# Patient Record
Sex: MALE | Race: WHITE | Employment: FULL TIME | URBAN - NONMETROPOLITAN AREA
[De-identification: names, ages, dates, MRNs, and addresses within clinical notes are randomized per-mention and may not be internally consistent; named-entity substitution may affect disease eponyms.]

---

## 2019-10-15 ENCOUNTER — OFFICE VISIT (OUTPATIENT)
Dept: PRIMARY CARE CLINIC | Age: 59
End: 2019-10-15
Payer: COMMERCIAL

## 2019-10-15 VITALS
TEMPERATURE: 98.2 F | WEIGHT: 206.75 LBS | SYSTOLIC BLOOD PRESSURE: 130 MMHG | HEART RATE: 78 BPM | DIASTOLIC BLOOD PRESSURE: 82 MMHG | HEIGHT: 72 IN | OXYGEN SATURATION: 98 % | BODY MASS INDEX: 28 KG/M2

## 2019-10-15 DIAGNOSIS — Z12.11 SCREENING FOR COLON CANCER: ICD-10-CM

## 2019-10-15 DIAGNOSIS — C61 PROSTATE CANCER (HCC): ICD-10-CM

## 2019-10-15 DIAGNOSIS — Z00.00 VISIT FOR PREVENTIVE HEALTH EXAMINATION: Primary | ICD-10-CM

## 2019-10-15 DIAGNOSIS — Z23 NEED FOR INFLUENZA VACCINATION: ICD-10-CM

## 2019-10-15 PROCEDURE — 90471 IMMUNIZATION ADMIN: CPT | Performed by: PEDIATRICS

## 2019-10-15 PROCEDURE — 90686 IIV4 VACC NO PRSV 0.5 ML IM: CPT | Performed by: PEDIATRICS

## 2019-10-15 PROCEDURE — 99396 PREV VISIT EST AGE 40-64: CPT | Performed by: PEDIATRICS

## 2019-10-15 SDOH — HEALTH STABILITY: MENTAL HEALTH: HOW OFTEN DO YOU HAVE A DRINK CONTAINING ALCOHOL?: MONTHLY OR LESS

## 2019-10-15 ASSESSMENT — ENCOUNTER SYMPTOMS
WHEEZING: 0
BACK PAIN: 0
CHEST TIGHTNESS: 0
ABDOMINAL PAIN: 0
NAUSEA: 0
SINUS PRESSURE: 0
ALLERGIC/IMMUNOLOGIC NEGATIVE: 1
SORE THROAT: 0
ROS SKIN COMMENTS: NO NEW OR SUSPICIOUS SKIN LESIONS
RHINORRHEA: 0
COUGH: 0
DIARRHEA: 0
TROUBLE SWALLOWING: 0
VOMITING: 0
SHORTNESS OF BREATH: 0
CONSTIPATION: 0

## 2019-10-15 ASSESSMENT — PATIENT HEALTH QUESTIONNAIRE - PHQ9
SUM OF ALL RESPONSES TO PHQ QUESTIONS 1-9: 0
1. LITTLE INTEREST OR PLEASURE IN DOING THINGS: 0
SUM OF ALL RESPONSES TO PHQ9 QUESTIONS 1 & 2: 0
SUM OF ALL RESPONSES TO PHQ QUESTIONS 1-9: 0
2. FEELING DOWN, DEPRESSED OR HOPELESS: 0

## 2019-10-16 LAB
ALBUMIN SERPL-MCNC: 3.1 G/DL
ALP BLD-CCNC: 99 U/L
ALT SERPL-CCNC: 66 U/L
ANION GAP SERPL CALCULATED.3IONS-SCNC: 10 MMOL/L
AST SERPL-CCNC: 44 U/L
BASOPHILS ABSOLUTE: 0 /ΜL
BASOPHILS RELATIVE PERCENT: 1.3 %
BILIRUB SERPL-MCNC: 0.6 MG/DL (ref 0.1–1.4)
BUN BLDV-MCNC: 9 MG/DL
C-REACTIVE PROTEIN: 31
CALCIUM SERPL-MCNC: 8.3 MG/DL
CHLORIDE BLD-SCNC: 103 MMOL/L
CO2: 25 MMOL/L
CREAT SERPL-MCNC: 0.9 MG/DL
EOSINOPHILS ABSOLUTE: 0 /ΜL
EOSINOPHILS RELATIVE PERCENT: 1.3 %
GFR CALCULATED: 86
GLUCOSE BLD-MCNC: 104 MG/DL
HCT VFR BLD CALC: 44 % (ref 41–53)
HEMOGLOBIN: 14.6 G/DL (ref 13.5–17.5)
LYMPHOCYTES ABSOLUTE: 1 /ΜL
LYMPHOCYTES RELATIVE PERCENT: 45.3 %
MCH RBC QN AUTO: 28 PG
MCHC RBC AUTO-ENTMCNC: 33.2 G/DL
MCV RBC AUTO: 84.5 FL
MONOCYTES ABSOLUTE: 0.4 /ΜL
MONOCYTES RELATIVE PERCENT: 17.3 %
NEUTROPHILS ABSOLUTE: 0.8 /ΜL
NEUTROPHILS RELATIVE PERCENT: 34.4 %
PDW BLD-RTO: 13.1 %
PLATELET # BLD: 137 K/ΜL
PMV BLD AUTO: 11.4 FL
POTASSIUM SERPL-SCNC: 4 MMOL/L
RBC # BLD: 5.21 10^6/ΜL
SODIUM BLD-SCNC: 138 MMOL/L
TOTAL PROTEIN: 6.2
WBC # BLD: 2.3 10^3/ML

## 2019-10-17 ENCOUNTER — TELEPHONE (OUTPATIENT)
Dept: PRIMARY CARE CLINIC | Age: 59
End: 2019-10-17

## 2019-10-17 DIAGNOSIS — Z12.11 SCREENING FOR COLON CANCER: ICD-10-CM

## 2019-10-17 DIAGNOSIS — Z00.00 VISIT FOR PREVENTIVE HEALTH EXAMINATION: ICD-10-CM

## 2019-10-17 LAB
ALBUMIN SERPL-MCNC: 4.3 G/DL (ref 3.5–5.2)
ALP BLD-CCNC: 79 U/L (ref 40–130)
ALT SERPL-CCNC: 27 U/L (ref 5–41)
ANION GAP SERPL CALCULATED.3IONS-SCNC: 16 MMOL/L (ref 7–19)
AST SERPL-CCNC: 18 U/L (ref 5–40)
BASOPHILS ABSOLUTE: 0.1 K/UL (ref 0–0.2)
BASOPHILS RELATIVE PERCENT: 0.8 % (ref 0–1)
BILIRUB SERPL-MCNC: 0.7 MG/DL (ref 0.2–1.2)
BUN BLDV-MCNC: 12 MG/DL (ref 6–20)
CALCIUM SERPL-MCNC: 9.3 MG/DL (ref 8.6–10)
CHLORIDE BLD-SCNC: 103 MMOL/L (ref 98–111)
CHOLESTEROL, TOTAL: 223 MG/DL (ref 160–199)
CO2: 23 MMOL/L (ref 22–29)
CREAT SERPL-MCNC: 0.8 MG/DL (ref 0.5–1.2)
EOSINOPHILS ABSOLUTE: 0.1 K/UL (ref 0–0.6)
EOSINOPHILS RELATIVE PERCENT: 1.5 % (ref 0–5)
GFR NON-AFRICAN AMERICAN: >60
GLUCOSE BLD-MCNC: 95 MG/DL (ref 74–109)
HCT VFR BLD CALC: 50.8 % (ref 42–52)
HDLC SERPL-MCNC: 52 MG/DL (ref 55–121)
HEMOGLOBIN: 15.8 G/DL (ref 14–18)
IMMATURE GRANULOCYTES #: 0 K/UL
LDL CHOLESTEROL CALCULATED: 148 MG/DL
LYMPHOCYTES ABSOLUTE: 4.4 K/UL (ref 1.1–4.5)
LYMPHOCYTES RELATIVE PERCENT: 59.1 % (ref 20–40)
MCH RBC QN AUTO: 28.5 PG (ref 27–31)
MCHC RBC AUTO-ENTMCNC: 31.1 G/DL (ref 33–37)
MCV RBC AUTO: 91.5 FL (ref 80–94)
MONOCYTES ABSOLUTE: 0.6 K/UL (ref 0–0.9)
MONOCYTES RELATIVE PERCENT: 8.4 % (ref 0–10)
NEUTROPHILS ABSOLUTE: 2.2 K/UL (ref 1.5–7.5)
NEUTROPHILS RELATIVE PERCENT: 29.8 % (ref 50–65)
PDW BLD-RTO: 13.2 % (ref 11.5–14.5)
PLATELET # BLD: 268 K/UL (ref 130–400)
PMV BLD AUTO: 11.5 FL (ref 9.4–12.4)
POTASSIUM SERPL-SCNC: 4.2 MMOL/L (ref 3.5–5)
PROSTATE SPECIFIC ANTIGEN: 0.01 NG/ML (ref 0–4)
RBC # BLD: 5.55 M/UL (ref 4.7–6.1)
SODIUM BLD-SCNC: 142 MMOL/L (ref 136–145)
TOTAL PROTEIN: 7.8 G/DL (ref 6.6–8.7)
TRIGL SERPL-MCNC: 115 MG/DL (ref 0–149)
TSH SERPL DL<=0.05 MIU/L-ACNC: 0.6 UIU/ML (ref 0.27–4.2)
WBC # BLD: 7.5 K/UL (ref 4.8–10.8)

## 2019-10-18 ENCOUNTER — TELEPHONE (OUTPATIENT)
Dept: PRIMARY CARE CLINIC | Age: 59
End: 2019-10-18

## 2019-10-18 DIAGNOSIS — E78.00 ELEVATED LDL CHOLESTEROL LEVEL: Primary | ICD-10-CM

## 2019-10-18 RX ORDER — ATORVASTATIN CALCIUM 20 MG/1
20 TABLET, FILM COATED ORAL DAILY
Qty: 30 TABLET | Refills: 11 | Status: SHIPPED | OUTPATIENT
Start: 2019-10-18 | End: 2020-09-23 | Stop reason: DRUGHIGH

## 2019-11-08 ENCOUNTER — TELEPHONE (OUTPATIENT)
Dept: GASTROENTEROLOGY | Age: 59
End: 2019-11-08

## 2020-06-14 ENCOUNTER — HOSPITAL ENCOUNTER (EMERGENCY)
Facility: HOSPITAL | Age: 60
End: 2020-06-14

## 2020-06-14 ENCOUNTER — HOSPITAL ENCOUNTER (INPATIENT)
Facility: HOSPITAL | Age: 60
LOS: 2 days | Discharge: HOME | End: 2020-06-16
Attending: INTERNAL MEDICINE | Admitting: INTERNAL MEDICINE

## 2020-06-14 DIAGNOSIS — I21.02 ST ELEVATION MYOCARDIAL INFARCTION INVOLVING LEFT ANTERIOR DESCENDING (LAD) CORONARY ARTERY (HCC): Primary | ICD-10-CM

## 2020-06-14 PROBLEM — I21.3 STEMI (ST ELEVATION MYOCARDIAL INFARCTION) (HCC): Status: ACTIVE | Noted: 2020-06-14

## 2020-06-14 LAB
TROPONIN T SERPL-MCNC: 2.48 NG/ML (ref 0–0.03)
TROPONIN T SERPL-MCNC: 6.98 NG/ML (ref 0–0.03)

## 2020-06-14 PROCEDURE — 99152 MOD SED SAME PHYS/QHP 5/>YRS: CPT | Performed by: INTERNAL MEDICINE

## 2020-06-14 PROCEDURE — 99153 MOD SED SAME PHYS/QHP EA: CPT | Performed by: INTERNAL MEDICINE

## 2020-06-14 PROCEDURE — 4A023N7 MEASUREMENT OF CARDIAC SAMPLING AND PRESSURE, LEFT HEART, PERCUTANEOUS APPROACH: ICD-10-PCS | Performed by: INTERNAL MEDICINE

## 2020-06-14 PROCEDURE — C1725 CATH, TRANSLUMIN NON-LASER: HCPCS | Performed by: INTERNAL MEDICINE

## 2020-06-14 PROCEDURE — C1887 CATHETER, GUIDING: HCPCS | Performed by: INTERNAL MEDICINE

## 2020-06-14 PROCEDURE — 25010000002 MIDAZOLAM PER 1 MG: Performed by: INTERNAL MEDICINE

## 2020-06-14 PROCEDURE — 0 IOPAMIDOL PER 1 ML: Performed by: INTERNAL MEDICINE

## 2020-06-14 PROCEDURE — 25010000002 DIPHENHYDRAMINE PER 50 MG: Performed by: INTERNAL MEDICINE

## 2020-06-14 PROCEDURE — C1874 STENT, COATED/COV W/DEL SYS: HCPCS | Performed by: INTERNAL MEDICINE

## 2020-06-14 PROCEDURE — 25010000002 BIVALIRUDIN TRIFLUOROACETATE 250 MG RECONSTITUTED SOLUTION: Performed by: INTERNAL MEDICINE

## 2020-06-14 PROCEDURE — 25010000002 FENTANYL CITRATE (PF) 100 MCG/2ML SOLUTION: Performed by: INTERNAL MEDICINE

## 2020-06-14 PROCEDURE — 93005 ELECTROCARDIOGRAM TRACING: CPT | Performed by: INTERNAL MEDICINE

## 2020-06-14 PROCEDURE — C1894 INTRO/SHEATH, NON-LASER: HCPCS | Performed by: INTERNAL MEDICINE

## 2020-06-14 PROCEDURE — 84484 ASSAY OF TROPONIN QUANT: CPT | Performed by: INTERNAL MEDICINE

## 2020-06-14 PROCEDURE — C1760 CLOSURE DEV, VASC: HCPCS | Performed by: INTERNAL MEDICINE

## 2020-06-14 PROCEDURE — 99223 1ST HOSP IP/OBS HIGH 75: CPT | Performed by: INTERNAL MEDICINE

## 2020-06-14 PROCEDURE — 027035Z DILATION OF CORONARY ARTERY, ONE ARTERY WITH TWO DRUG-ELUTING INTRALUMINAL DEVICES, PERCUTANEOUS APPROACH: ICD-10-PCS | Performed by: INTERNAL MEDICINE

## 2020-06-14 PROCEDURE — 93458 L HRT ARTERY/VENTRICLE ANGIO: CPT | Performed by: INTERNAL MEDICINE

## 2020-06-14 PROCEDURE — 92941 PRQ TRLML REVSC TOT OCCL AMI: CPT | Performed by: INTERNAL MEDICINE

## 2020-06-14 PROCEDURE — C9606 PERC D-E COR REVASC W AMI S: HCPCS | Performed by: INTERNAL MEDICINE

## 2020-06-14 PROCEDURE — B2151ZZ FLUOROSCOPY OF LEFT HEART USING LOW OSMOLAR CONTRAST: ICD-10-PCS | Performed by: INTERNAL MEDICINE

## 2020-06-14 PROCEDURE — C1769 GUIDE WIRE: HCPCS | Performed by: INTERNAL MEDICINE

## 2020-06-14 PROCEDURE — B2111ZZ FLUOROSCOPY OF MULTIPLE CORONARY ARTERIES USING LOW OSMOLAR CONTRAST: ICD-10-PCS | Performed by: INTERNAL MEDICINE

## 2020-06-14 PROCEDURE — 99201: CPT

## 2020-06-14 DEVICE — XIENCE SIERRA™ EVEROLIMUS ELUTING CORONARY STENT SYSTEM 3.00 MM X 08 MM / RAPID-EXCHANGE
Type: IMPLANTABLE DEVICE | Status: FUNCTIONAL
Brand: XIENCE SIERRA™

## 2020-06-14 DEVICE — XIENCE SIERRA™ EVEROLIMUS ELUTING CORONARY STENT SYSTEM 3.00 MM X 18 MM / RAPID-EXCHANGE
Type: IMPLANTABLE DEVICE | Status: FUNCTIONAL
Brand: XIENCE SIERRA™

## 2020-06-14 RX ORDER — SODIUM CHLORIDE 0.9 % (FLUSH) 0.9 %
10 SYRINGE (ML) INJECTION AS NEEDED
Status: DISCONTINUED | OUTPATIENT
Start: 2020-06-14 | End: 2020-06-16 | Stop reason: HOSPADM

## 2020-06-14 RX ORDER — FENTANYL CITRATE 50 UG/ML
INJECTION, SOLUTION INTRAMUSCULAR; INTRAVENOUS AS NEEDED
Status: DISCONTINUED | OUTPATIENT
Start: 2020-06-14 | End: 2020-06-14 | Stop reason: HOSPADM

## 2020-06-14 RX ORDER — ATORVASTATIN CALCIUM 40 MG/1
40 TABLET, FILM COATED ORAL NIGHTLY
Status: DISCONTINUED | OUTPATIENT
Start: 2020-06-14 | End: 2020-06-16 | Stop reason: HOSPADM

## 2020-06-14 RX ORDER — ACETAMINOPHEN 325 MG/1
650 TABLET ORAL EVERY 4 HOURS PRN
Status: DISCONTINUED | OUTPATIENT
Start: 2020-06-14 | End: 2020-06-16 | Stop reason: HOSPADM

## 2020-06-14 RX ORDER — SODIUM CHLORIDE 0.9 % (FLUSH) 0.9 %
10 SYRINGE (ML) INJECTION EVERY 12 HOURS SCHEDULED
Status: DISCONTINUED | OUTPATIENT
Start: 2020-06-14 | End: 2020-06-16 | Stop reason: HOSPADM

## 2020-06-14 RX ORDER — SODIUM CHLORIDE 9 MG/ML
125 INJECTION, SOLUTION INTRAVENOUS CONTINUOUS
Status: DISPENSED | OUTPATIENT
Start: 2020-06-14 | End: 2020-06-14

## 2020-06-14 RX ORDER — DIPHENHYDRAMINE HYDROCHLORIDE 50 MG/ML
INJECTION INTRAMUSCULAR; INTRAVENOUS AS NEEDED
Status: DISCONTINUED | OUTPATIENT
Start: 2020-06-14 | End: 2020-06-14 | Stop reason: HOSPADM

## 2020-06-14 RX ORDER — MIDAZOLAM HYDROCHLORIDE 1 MG/ML
INJECTION INTRAMUSCULAR; INTRAVENOUS AS NEEDED
Status: DISCONTINUED | OUTPATIENT
Start: 2020-06-14 | End: 2020-06-14 | Stop reason: HOSPADM

## 2020-06-14 RX ORDER — BIVALIRUDIN 250 MG/5ML
INJECTION, POWDER, LYOPHILIZED, FOR SOLUTION INTRAVENOUS AS NEEDED
Status: DISCONTINUED | OUTPATIENT
Start: 2020-06-14 | End: 2020-06-14 | Stop reason: HOSPADM

## 2020-06-14 RX ORDER — ASPIRIN 81 MG/1
81 TABLET ORAL DAILY
Status: DISCONTINUED | OUTPATIENT
Start: 2020-06-14 | End: 2020-06-16 | Stop reason: HOSPADM

## 2020-06-14 RX ADMIN — ACETAMINOPHEN 650 MG: 325 TABLET, FILM COATED ORAL at 22:49

## 2020-06-14 RX ADMIN — SODIUM CHLORIDE, PRESERVATIVE FREE 10 ML: 5 INJECTION INTRAVENOUS at 20:34

## 2020-06-14 RX ADMIN — METOPROLOL TARTRATE 25 MG: 25 TABLET, FILM COATED ORAL at 20:34

## 2020-06-14 RX ADMIN — ATORVASTATIN CALCIUM 40 MG: 40 TABLET, FILM COATED ORAL at 20:34

## 2020-06-14 RX ADMIN — ASPIRIN 81 MG: 81 TABLET ORAL at 16:35

## 2020-06-14 RX ADMIN — SODIUM CHLORIDE 125 ML/HR: 9 INJECTION, SOLUTION INTRAVENOUS at 14:37

## 2020-06-14 RX ADMIN — TICAGRELOR 90 MG: 90 TABLET ORAL at 20:34

## 2020-06-14 NOTE — H&P
Chief Complaint   Patient presents with   • STEMI       Subjective .     History of present illness:  Wale Valente is a 59 y.o. yo male with no prior medical history who presented today to the [facility] with SSCP that began about 2 hours ago assoccated with SOB and nausea. He was diagnosed with an ant STEMI and transferred to [facility]. On arrival here he complains of persistent SSCP.  Chief Complaint   Patient presents with   • STEMI   .    History  No past medical history on file.,   No past surgical history on file.,   No family history on file.,   Social History     Tobacco Use   • Smoking status: Not on file   Substance Use Topics   • Alcohol use: Not on file   • Drug use: Not on file   ,     Medications  No current facility-administered medications for this encounter.      No current outpatient medications on file.       Allergies:  Patient has no allergy information on record.    Review of Systems  Review of Systems   HENT: Negative for nosebleeds.    Cardiovascular: Positive for chest pain. Negative for claudication, dyspnea on exertion, irregular heartbeat, leg swelling, near-syncope, orthopnea, palpitations, paroxysmal nocturnal dyspnea and syncope.   Respiratory: Positive for shortness of breath. Negative for cough and hemoptysis.    Gastrointestinal: Negative for dysphagia, hematemesis and melena.   Genitourinary: Negative for hematuria.   All other systems reviewed and are negative.      Objective     Physical Exam:  No data found.  Physical Exam   Constitutional: He is oriented to person, place, and time. He appears well-nourished. He appears distressed.   HENT:   Head: Normocephalic.   Eyes: No scleral icterus.   Neck: Normal range of motion. Neck supple.   Cardiovascular: Normal rate, regular rhythm and normal heart sounds. Exam reveals no gallop and no friction rub.   No murmur heard.  Pulmonary/Chest: Effort normal and breath sounds normal. No respiratory distress. He has no wheezes. He has no rales.    Abdominal: Soft. Bowel sounds are normal. He exhibits no distension. There is no tenderness.   Musculoskeletal: He exhibits no edema.   Neurological: He is alert and oriented to person, place, and time.   Skin: Skin is warm. He is diaphoretic. No erythema.   Psychiatric: He has a normal mood and affect. His behavior is normal.       Results Review:   I reviewed the patient's new clinical results.  Lab Results (last 24 hours)     ** No results found for the last 24 hours. **        Imaging Results (Last 24 Hours)     ** No results found for the last 24 hours. **        No results found for: ECHOEFEST  I have reviewed his ECG which reveals SR with Anterior ST elevation    Assessment/Plan     * No active hospital problems. *    New Problems that need assessment:  1. Ant STEMI, plan emergent PCI. The risk and potential complications as well as anticipated benefits were discussed with the patient and he wishes to proceed with the planned procedure  2. HLD  New problems that are stable  None identified    MDM High Complexity

## 2020-06-15 ENCOUNTER — APPOINTMENT (OUTPATIENT)
Dept: CARDIOLOGY | Facility: HOSPITAL | Age: 60
End: 2020-06-15

## 2020-06-15 LAB
ANION GAP SERPL CALCULATED.3IONS-SCNC: 10 MMOL/L (ref 5–15)
BH CV ECHO MEAS - AO MAX PG (FULL): 1.9 MMHG
BH CV ECHO MEAS - AO MAX PG: 5.6 MMHG
BH CV ECHO MEAS - AO MEAN PG (FULL): -1 MMHG
BH CV ECHO MEAS - AO MEAN PG: 2 MMHG
BH CV ECHO MEAS - AO ROOT AREA (BSA CORRECTED): 1.6
BH CV ECHO MEAS - AO ROOT AREA: 9.6 CM^2
BH CV ECHO MEAS - AO ROOT DIAM: 3.5 CM
BH CV ECHO MEAS - AO V2 MAX: 118 CM/SEC
BH CV ECHO MEAS - AO V2 MEAN: 69.6 CM/SEC
BH CV ECHO MEAS - AO V2 VTI: 21 CM
BH CV ECHO MEAS - AVA(I,A): 4 CM^2
BH CV ECHO MEAS - AVA(I,D): 4 CM^2
BH CV ECHO MEAS - AVA(V,A): 3 CM^2
BH CV ECHO MEAS - AVA(V,D): 3 CM^2
BH CV ECHO MEAS - BSA(HAYCOCK): 2.2 M^2
BH CV ECHO MEAS - BSA: 2.1 M^2
BH CV ECHO MEAS - BZI_BMI: 27.1 KILOGRAMS/M^2
BH CV ECHO MEAS - BZI_METRIC_HEIGHT: 182.9 CM
BH CV ECHO MEAS - BZI_METRIC_WEIGHT: 90.7 KG
BH CV ECHO MEAS - EDV(CUBED): 69.4 ML
BH CV ECHO MEAS - EDV(MOD-SP4): 153 ML
BH CV ECHO MEAS - EDV(TEICH): 74.7 ML
BH CV ECHO MEAS - EF(CUBED): 79.8 %
BH CV ECHO MEAS - EF(MOD-SP4): 57.8 %
BH CV ECHO MEAS - EF(TEICH): 72.7 %
BH CV ECHO MEAS - ESV(CUBED): 14 ML
BH CV ECHO MEAS - ESV(MOD-SP4): 64.6 ML
BH CV ECHO MEAS - ESV(TEICH): 20.4 ML
BH CV ECHO MEAS - FS: 41.4 %
BH CV ECHO MEAS - IVS/LVPW: 0.93
BH CV ECHO MEAS - IVSD: 0.94 CM
BH CV ECHO MEAS - LA DIMENSION: 3.8 CM
BH CV ECHO MEAS - LA/AO: 1.1
BH CV ECHO MEAS - LAT PEAK E' VEL: 10.6 CM/SEC
BH CV ECHO MEAS - LV DIASTOLIC VOL/BSA (35-75): 71.8 ML/M^2
BH CV ECHO MEAS - LV MASS(C)D: 129.3 GRAMS
BH CV ECHO MEAS - LV MASS(C)DI: 60.7 GRAMS/M^2
BH CV ECHO MEAS - LV MAX PG: 3.7 MMHG
BH CV ECHO MEAS - LV MEAN PG: 3 MMHG
BH CV ECHO MEAS - LV SYSTOLIC VOL/BSA (12-30): 30.3 ML/M^2
BH CV ECHO MEAS - LV V1 MAX: 94.5 CM/SEC
BH CV ECHO MEAS - LV V1 MEAN: 74.3 CM/SEC
BH CV ECHO MEAS - LV V1 VTI: 21.9 CM
BH CV ECHO MEAS - LVIDD: 4.1 CM
BH CV ECHO MEAS - LVIDS: 2.4 CM
BH CV ECHO MEAS - LVLD AP4: 8.9 CM
BH CV ECHO MEAS - LVLS AP4: 7.7 CM
BH CV ECHO MEAS - LVOT AREA (M): 3.8 CM^2
BH CV ECHO MEAS - LVOT AREA: 3.8 CM^2
BH CV ECHO MEAS - LVOT DIAM: 2.2 CM
BH CV ECHO MEAS - LVPWD: 1 CM
BH CV ECHO MEAS - MED PEAK E' VEL: 9.3 CM/SEC
BH CV ECHO MEAS - MR MAX PG: 36 MMHG
BH CV ECHO MEAS - MR MAX VEL: 281 CM/SEC
BH CV ECHO MEAS - MR MEAN PG: 40 MMHG
BH CV ECHO MEAS - MR MEAN VEL: 301 CM/SEC
BH CV ECHO MEAS - MR VTI: 126 CM
BH CV ECHO MEAS - MV A MAX VEL: 58.2 CM/SEC
BH CV ECHO MEAS - MV DEC TIME: 0.18 SEC
BH CV ECHO MEAS - MV E MAX VEL: 75.2 CM/SEC
BH CV ECHO MEAS - MV E/A: 1.3
BH CV ECHO MEAS - SI(AO): 94.6 ML/M^2
BH CV ECHO MEAS - SI(CUBED): 26 ML/M^2
BH CV ECHO MEAS - SI(LVOT): 39.1 ML/M^2
BH CV ECHO MEAS - SI(MOD-SP4): 41.5 ML/M^2
BH CV ECHO MEAS - SI(TEICH): 25.5 ML/M^2
BH CV ECHO MEAS - SV(AO): 201.6 ML
BH CV ECHO MEAS - SV(CUBED): 55.4 ML
BH CV ECHO MEAS - SV(LVOT): 83.2 ML
BH CV ECHO MEAS - SV(MOD-SP4): 88.4 ML
BH CV ECHO MEAS - SV(TEICH): 54.3 ML
BH CV ECHO MEAS - TR MAX VEL: 239 CM/SEC
BH CV ECHO MEASUREMENTS AVERAGE E/E' RATIO: 7.56
BUN BLD-MCNC: 13 MG/DL (ref 6–20)
BUN/CREAT SERPL: 16.9 (ref 7–25)
CALCIUM SPEC-SCNC: 8.9 MG/DL (ref 8.6–10.5)
CHLORIDE SERPL-SCNC: 106 MMOL/L (ref 98–107)
CHOLEST SERPL-MCNC: 149 MG/DL (ref 0–200)
CO2 SERPL-SCNC: 24 MMOL/L (ref 22–29)
CREAT BLD-MCNC: 0.77 MG/DL (ref 0.76–1.27)
DEPRECATED RDW RBC AUTO: 41.4 FL (ref 37–54)
ERYTHROCYTE [DISTWIDTH] IN BLOOD BY AUTOMATED COUNT: 13.3 % (ref 12.3–15.4)
GFR SERPL CREATININE-BSD FRML MDRD: 103 ML/MIN/1.73
GLUCOSE BLD-MCNC: 106 MG/DL (ref 65–99)
HBA1C MFR BLD: 5.3 % (ref 4.8–5.6)
HCT VFR BLD AUTO: 42.8 % (ref 37.5–51)
HDLC SERPL-MCNC: 45 MG/DL (ref 40–60)
HGB BLD-MCNC: 13.8 G/DL (ref 13–17.7)
LDLC SERPL CALC-MCNC: 88 MG/DL (ref 0–100)
LDLC/HDLC SERPL: 1.96 {RATIO}
LEFT ATRIUM VOLUME INDEX: 30.7 ML/M2
LEFT ATRIUM VOLUME: 65.4 CM3
LV EF 2D ECHO EST: 55 %
MAXIMAL PREDICTED HEART RATE: 161 BPM
MCH RBC QN AUTO: 27.6 PG (ref 26.6–33)
MCHC RBC AUTO-ENTMCNC: 32.2 G/DL (ref 31.5–35.7)
MCV RBC AUTO: 85.6 FL (ref 79–97)
PLATELET # BLD AUTO: 250 10*3/MM3 (ref 140–450)
PMV BLD AUTO: 11 FL (ref 6–12)
POTASSIUM BLD-SCNC: 4.2 MMOL/L (ref 3.5–5.2)
RBC # BLD AUTO: 5 10*6/MM3 (ref 4.14–5.8)
SODIUM BLD-SCNC: 140 MMOL/L (ref 136–145)
STRESS TARGET HR: 137 BPM
TRIGL SERPL-MCNC: 80 MG/DL (ref 0–150)
VLDLC SERPL-MCNC: 16 MG/DL
WBC NRBC COR # BLD: 9.96 10*3/MM3 (ref 3.4–10.8)

## 2020-06-15 PROCEDURE — 93306 TTE W/DOPPLER COMPLETE: CPT

## 2020-06-15 PROCEDURE — 94799 UNLISTED PULMONARY SVC/PX: CPT

## 2020-06-15 PROCEDURE — 80061 LIPID PANEL: CPT | Performed by: INTERNAL MEDICINE

## 2020-06-15 PROCEDURE — 83036 HEMOGLOBIN GLYCOSYLATED A1C: CPT | Performed by: INTERNAL MEDICINE

## 2020-06-15 PROCEDURE — 85027 COMPLETE CBC AUTOMATED: CPT | Performed by: INTERNAL MEDICINE

## 2020-06-15 PROCEDURE — 80048 BASIC METABOLIC PNL TOTAL CA: CPT | Performed by: INTERNAL MEDICINE

## 2020-06-15 PROCEDURE — 93010 ELECTROCARDIOGRAM REPORT: CPT | Performed by: INTERNAL MEDICINE

## 2020-06-15 PROCEDURE — 99232 SBSQ HOSP IP/OBS MODERATE 35: CPT | Performed by: INTERNAL MEDICINE

## 2020-06-15 PROCEDURE — 25010000002 PERFLUTREN 6.52 MG/ML SUSPENSION: Performed by: INTERNAL MEDICINE

## 2020-06-15 PROCEDURE — 93306 TTE W/DOPPLER COMPLETE: CPT | Performed by: INTERNAL MEDICINE

## 2020-06-15 PROCEDURE — 93005 ELECTROCARDIOGRAM TRACING: CPT | Performed by: INTERNAL MEDICINE

## 2020-06-15 RX ADMIN — METOPROLOL TARTRATE 25 MG: 25 TABLET, FILM COATED ORAL at 21:00

## 2020-06-15 RX ADMIN — SODIUM CHLORIDE, PRESERVATIVE FREE 10 ML: 5 INJECTION INTRAVENOUS at 08:31

## 2020-06-15 RX ADMIN — TICAGRELOR 90 MG: 90 TABLET ORAL at 21:00

## 2020-06-15 RX ADMIN — TICAGRELOR 90 MG: 90 TABLET ORAL at 08:31

## 2020-06-15 RX ADMIN — ASPIRIN 81 MG: 81 TABLET ORAL at 08:31

## 2020-06-15 RX ADMIN — SODIUM CHLORIDE, PRESERVATIVE FREE 10 ML: 5 INJECTION INTRAVENOUS at 21:00

## 2020-06-15 RX ADMIN — METOPROLOL TARTRATE 25 MG: 25 TABLET, FILM COATED ORAL at 08:31

## 2020-06-15 RX ADMIN — PERFLUTREN: 6.52 INJECTION, SUSPENSION INTRAVENOUS at 09:10

## 2020-06-15 RX ADMIN — ATORVASTATIN CALCIUM 40 MG: 40 TABLET, FILM COATED ORAL at 21:00

## 2020-06-15 NOTE — PROGRESS NOTES
Discharge Planning Assessment  [facility]     Patient Name: Wale Valente  MRN: [id number]  Today's Date: 6/15/2020    Admit Date: 6/14/2020    Discharge Needs Assessment     Row Name 06/15/20 0951       Living Environment    Lives With  spouse    Current Living Arrangements  home/apartment/condo    Primary Care Provided by  self    Provides Primary Care For  no one    Family Caregiver if Needed  spouse    Quality of Family Relationships  unable to assess    Able to Return to Prior Arrangements  yes       Resource/Environmental Concerns    Resource/Environmental Concerns  none       Transition Planning    Patient/Family Anticipates Transition to  home with family    Patient/Family Anticipated Services at Transition  none    Transportation Anticipated  family or friend will provide       Discharge Needs Assessment    Readmission Within the Last 30 Days  no previous admission in last 30 days    Concerns to be Addressed  no discharge needs identified    Equipment Currently Used at Home  none    Anticipated Changes Related to Illness  none    Equipment Needed After Discharge  none    Discharge Coordination/Progress  Patient admitted from home where he resides with his spouse.  Patient is independent, has a PCP and RX coverage.  Patient plans to return home upon discharge with no anticipated needs.        Discharge Plan    No documentation.       Destination      Coordination has not been started for this encounter.      Durable Medical Equipment      Coordination has not been started for this encounter.      Dialysis/Infusion      Coordination has not been started for this encounter.      Home Medical Care      Coordination has not been started for this encounter.      Therapy      Coordination has not been started for this encounter.      Community Resources      Coordination has not been started for this encounter.          Demographic Summary    No documentation.       Functional Status    No documentation.        Psychosocial    No documentation.       Abuse/Neglect    No documentation.       Legal    No documentation.       Substance Abuse    No documentation.       Patient Forms    No documentation.           ASIF KernW     no chills/no fever

## 2020-06-15 NOTE — NURSING NOTE
Gave report to Stefanie that is receiving the pt into room 437. Pt alert and oriented x4 and ra. No s/s of distress or pain at this time

## 2020-06-15 NOTE — PROGRESS NOTES
Referring Provider: Wale Liz MD    Length of Stay: 1    Chief Complaint:   Chief Complaint   Patient presents with   • STEMI       Subjective: No chest pain    Medications  Current Facility-Administered Medications   Medication Dose Route Frequency Provider Last Rate Last Dose   • acetaminophen (TYLENOL) tablet 650 mg  650 mg Oral Q4H PRN Wale Liz MD   650 mg at 06/14/20 2249   • aspirin EC tablet 81 mg  81 mg Oral Daily Wale Liz MD   81 mg at 06/14/20 1635   • atorvastatin (LIPITOR) tablet 40 mg  40 mg Oral Nightly LizWale starr MD   40 mg at 06/14/20 2034   • metoprolol tartrate (LOPRESSOR) tablet 25 mg  25 mg Oral Q12H LizWale starr MD   25 mg at 06/14/20 2034   • sodium chloride 0.9 % flush 10 mL  10 mL Intravenous Q12H Wale Liz MD   10 mL at 06/14/20 2034   • sodium chloride 0.9 % flush 10 mL  10 mL Intravenous PRN Wale Liz MD       • ticagrelor (BRILINTA) tablet 90 mg  90 mg Oral BID LizWale starr MD   90 mg at 06/14/20 2034     Facility-Administered Medications Ordered in Other Encounters   Medication Dose Route Frequency Provider Last Rate Last Dose   • heparin infusion 2 units/mL in 0.9% NaCl    PRN Wale Liz MD   1,000 mL at 06/14/20 1342   • iopamidol (ISOVUE-370) 76 % injection    PRN Wale Liz MD   162 mL at 06/14/20 1345   • lidocaine (cardiac) (XYLOCAINE) injection    PRN Wale Liz MD   10 mL at 06/14/20 1323       No past medical history on file.,   No past surgical history on file.,   No family history on file.,   Social History     Tobacco Use   • Smoking status: Not on file   Substance Use Topics   • Alcohol use: Not on file   • Drug use: Not on file   ,    Review of Systems  Review of Systems   HENT: Negative for nosebleeds.    Cardiovascular: Negative for chest pain, claudication, dyspnea on exertion, irregular heartbeat, leg swelling, near-syncope, orthopnea, palpitations, paroxysmal nocturnal dyspnea and syncope.   Respiratory: Negative for  "cough, hemoptysis and shortness of breath.    Gastrointestinal: Negative for dysphagia, hematemesis and melena.   Genitourinary: Negative for hematuria.   All other systems reviewed and are negative.      Objective     Physical Exam:  Patient Vitals for the past 24 hrs:   BP Temp Temp src Pulse Resp SpO2 Height Weight   06/15/20 0700 -- -- -- 68 -- 98 % -- --   06/15/20 0638 -- -- -- 53 14 99 % -- --   06/15/20 0600 126/75 -- -- 71 -- 97 % -- 90.9 kg (200 lb 6.4 oz)   06/15/20 0400 106/85 -- -- 77 -- 98 % -- --   06/15/20 0300 103/76 -- -- 57 -- 98 % -- --   06/15/20 0200 113/73 -- -- 64 -- 99 % -- --   06/15/20 0100 102/61 -- -- 58 -- 96 % -- --   06/15/20 0000 96/58 -- -- 65 -- 95 % -- --   06/14/20 2300 108/67 -- -- 63 -- 97 % -- --   06/14/20 2200 100/71 -- -- 57 -- 97 % -- --   06/14/20 2100 111/75 -- -- 63 -- 96 % -- --   06/14/20 2034 110/60 -- -- 66 -- -- -- --   06/14/20 2000 122/70 98.3 °F (36.8 °C) Oral 77 -- 95 % -- --   06/14/20 1900 94/49 -- -- 66 16 95 % -- --   06/14/20 1830 103/55 -- -- 69 -- 97 % -- --   06/14/20 1800 96/68 -- -- 75 18 99 % -- --   06/14/20 1730 98/48 -- -- 68 -- 96 % -- --   06/14/20 1700 123/68 -- -- 75 16 98 % -- --   06/14/20 1645 119/64 -- -- 75 -- 98 % -- --   06/14/20 1630 123/72 -- -- 80 -- 97 % -- --   06/14/20 1615 130/81 -- -- 75 -- 97 % -- --   06/14/20 1600 112/75 97.5 °F (36.4 °C) Axillary 66 17 96 % -- --   06/14/20 1545 125/72 -- -- 80 -- 95 % -- --   06/14/20 1530 117/75 -- -- 79 -- 95 % -- --   06/14/20 1515 118/73 -- -- 82 -- 94 % -- --   06/14/20 1500 108/67 -- -- 79 -- 95 % -- --   06/14/20 1445 104/61 98.5 °F (36.9 °C) Oral 82 14 95 % -- --   06/14/20 1430 104/66 98.4 °F (36.9 °C) Oral 88 13 97 % -- --   06/14/20 1415 111/69 98.5 °F (36.9 °C) Oral 83 12 97 % -- --   06/14/20 1410 114/71 98.5 °F (36.9 °C) Oral 85 14 97 % -- --   06/14/20 1325 -- -- -- -- -- -- 182.9 cm (72\") 90.7 kg (200 lb)   06/14/20 1315 -- -- -- -- -- 100 % -- --     Physical Exam "   Constitutional: He is oriented to person, place, and time. He appears well-nourished. No distress.   HENT:   Head: Normocephalic.   Eyes: No scleral icterus.   Neck: Normal range of motion. Neck supple.   Cardiovascular: Normal rate, regular rhythm and normal heart sounds. Exam reveals no gallop and no friction rub.   No murmur heard.  Pulmonary/Chest: Effort normal and breath sounds normal. No respiratory distress. He has no wheezes. He has no rales.   Abdominal: Soft. Bowel sounds are normal. He exhibits no distension. There is no tenderness.   Musculoskeletal: He exhibits no edema.   Neurological: He is alert and oriented to person, place, and time.   Skin: Skin is warm and dry. He is not diaphoretic. No erythema.   Psychiatric: He has a normal mood and affect. His behavior is normal.   no hematoma    Results Review:   I reviewed the patient's new clinical results.  Lab Results (last 24 hours)     Procedure Component Value Units Date/Time    Basic Metabolic Panel [[id number]]  (Abnormal) Collected:  06/15/20 0433    Specimen:  Blood Updated:  06/15/20 0529     Glucose 106 mg/dL      BUN 13 mg/dL      Creatinine 0.77 mg/dL      Sodium 140 mmol/L      Potassium 4.2 mmol/L      Chloride 106 mmol/L      CO2 24.0 mmol/L      Calcium 8.9 mg/dL      eGFR Non African Amer 103 mL/min/1.73      BUN/Creatinine Ratio 16.9     Anion Gap 10.0 mmol/L     Narrative:       GFR Normal >60  Chronic Kidney Disease <60  Kidney Failure <15      Lipid Panel [[id number]] Collected:  06/15/20 0433    Specimen:  Blood Updated:  06/15/20 0529     Total Cholesterol 149 mg/dL      Triglycerides 80 mg/dL      HDL Cholesterol 45 mg/dL      LDL Cholesterol  88 mg/dL      VLDL Cholesterol 16 mg/dL      LDL/HDL Ratio 1.96    Narrative:       Cholesterol Reference Ranges  (U.S. Department of Health and Human Services ATP III Classifications)    Desirable          <200 mg/dL  Borderline High    200-239 mg/dL  High Risk          >240  mg/dL      Triglyceride Reference Ranges  (U.S. Department of Health and Human Services ATP III Classifications)    Normal           <150 mg/dL  Borderline High  150-199 mg/dL  High             200-499 mg/dL  Very High        >500 mg/dL    HDL Reference Ranges  (U.S. Department of Health and Human Services ATP III Classifcations)    Low     <40 mg/dl (major risk factor for CHD)  High    >60 mg/dl ('negative' risk factor for CHD)        LDL Reference Ranges  (U.S. Department of Health and Human Services ATP III Classifcations)    Optimal          <100 mg/dL  Near Optimal     100-129 mg/dL  Borderline High  130-159 mg/dL  High             160-189 mg/dL  Very High        >189 mg/dL    Hemoglobin A1c [[id number]]  (Normal) Collected:  06/15/20 0433    Specimen:  Blood Updated:  06/15/20 0529     Hemoglobin A1C 5.30 %     Narrative:       Hemoglobin A1C Ranges:    Increased Risk for Diabetes  5.7% to 6.4%  Diabetes                     >= 6.5%  Diabetic Goal                < 7.0%    CBC (No Diff) [[id number]]  (Normal) Collected:  06/15/20 0433    Specimen:  Blood Updated:  06/15/20 0505     WBC 9.96 10*3/mm3      RBC 5.00 10*6/mm3      Hemoglobin 13.8 g/dL      Hematocrit 42.8 %      MCV 85.6 fL      MCH 27.6 pg      MCHC 32.2 g/dL      RDW 13.3 %      RDW-SD 41.4 fl      MPV 11.0 fL      Platelets 250 10*3/mm3     Troponin [[id number]]  (Abnormal) Collected:  06/14/20 2002    Specimen:  Blood Updated:  06/14/20 2035     Troponin T 6.980 ng/mL     Narrative:       Troponin T Reference Range:  <= 0.03 ng/mL-   Negative for AMI  >0.03 ng/mL-     Abnormal for myocardial necrosis.  Clinicians would have to utilize clinical acumen, EKG, Troponin and serial changes to determine if it is an Acute Myocardial Infarction or myocardial injury due to an underlying chronic condition.       Results may be falsely decreased if patient taking Biotin.      Troponin [[id number]]  (Abnormal) Collected:  06/14/20 1507    Specimen:  Blood  Updated:  06/14/20 1610     Troponin T 2.480 ng/mL     Narrative:       Troponin T Reference Range:  <= 0.03 ng/mL-   Negative for AMI  >0.03 ng/mL-     Abnormal for myocardial necrosis.  Clinicians would have to utilize clinical acumen, EKG, Troponin and serial changes to determine if it is an Acute Myocardial Infarction or myocardial injury due to an underlying chronic condition.       Results may be falsely decreased if patient taking Biotin.          No results found for: ECHOEFEST  Imaging Results (Last 24 Hours)     ** No results found for the last 24 hours. **          I have reviewed telemetry which reveals SR with persistent ST elevation in the anterior leads but improved overall    Assessment/Plan     ST elevation myocardial infarction involving left anterior descending (LAD) coronary artery (CMS/HCC)    STEMI (ST elevation myocardial infarction) (CMS/HCC)      New Problems that need treatment:  1. Ant STEMI, s/p MONAE. Much improved. Echo pending. Will move to 4B. Home tomorrow    Old problems that are stable:   HLD    Medical Decision Making: Moderate Complexity

## 2020-06-15 NOTE — PLAN OF CARE
Sandbag removed from R groin at 1930. No complications since. Site soft and non tender to palpation.  Distal pulses 2+ to palpation. NSR 60-70s. Normotensive, 120s/70s. RA. Gave PRN tylenol for complaint of R thigh pain, no complaints since. A&Ox4. NYE,  equal. PERRLA. UOP adequate. Will continue to monitor.

## 2020-06-15 NOTE — PLAN OF CARE
Problem: Patient Care Overview  Goal: Plan of Care Review  Outcome: Ongoing (interventions implemented as appropriate)  Flowsheets (Taken 6/15/2020 7051)  Progress: improving  Plan of Care Reviewed With: patient  Outcome Summary: Pt transferred from CCU to  today. R groin CDI/soft. PPP. Pt had 11 beats vtach. Pt asymptomatic. VSS. Reading room notified and message delayed. No new orders. Denies pain during shift. Safety maintained, cont to monitor.

## 2020-06-15 NOTE — PAYOR COMM NOTE
"FROM: CUAUHTEMOC GUTIERREZ  PHONE: [phone]  FAX: [phone]    PENDING: [id number]    Wale Cano (59 y.o. Male)     Date of Birth Social Security Number Address Home Phone MRN    1960  [id number] [place] [address] [phone] [id number]    Faith Marital Status          Other        Admission Date Admission Type Admitting Provider Attending Provider Department, Room/Bed    6/14/20 Urgent Wale Liz MD Ford, James K, MD [facility] 4B, 437/1    Discharge Date Discharge Disposition Discharge Destination                       Attending Provider:  Wale Liz MD    Allergies:  No Known Allergies    Isolation:  None   Infection:  None   Code Status:  CPR    Ht:  182.9 cm (72\")   Wt:  89.5 kg (197 lb 5 oz)    Admission Cmt:  None   Principal Problem:  None                Active Insurance as of 6/14/2020     Primary Coverage     Payor Plan Insurance Group Employer/Plan Group    [facility] [employer] CR[employer] EMPL[employer]EE [id number]     Payor Plan Address Payor Plan Phone Number Payor Plan Fax Number Effective Dates    [address] [phone]  1/1/2015 - None Entered    [facility] [id number]       Subscriber Name Subscriber Birth Date Member ID       URSULA CANO 12/10/1963 [id number]                 Emergency Contacts      (Rel.) Home Phone Work Phone Mobile Phone    Ursula Cano (Spouse) -- -- [phone]    SidraJesse salguero (Son) -- -- [phone]    SidraYann salguero (Son) -- -- [phone]               History & Physical      Wale Liz MD at 06/14/20 1305          Chief Complaint   Patient presents with   • STEMI       Subjective .     History of present illness:  Wale Cano is a 59 y.o. yo male with no prior medical history who presented today to the [facility] with SSCP that began about 2 hours ago assoccated with SOB and nausea. He was diagnosed with an ant STEMI and transferred to [facility]. On arrival here he complains of persistent " SSCP.  Chief Complaint   Patient presents with   • STEMI   .    History  No past medical history on file.,   No past surgical history on file.,   No family history on file.,   Social History     Tobacco Use   • Smoking status: Not on file   Substance Use Topics   • Alcohol use: Not on file   • Drug use: Not on file   ,     Medications  No current facility-administered medications for this encounter.      No current outpatient medications on file.       Allergies:  Patient has no allergy information on record.    Review of Systems  Review of Systems   HENT: Negative for nosebleeds.    Cardiovascular: Positive for chest pain. Negative for claudication, dyspnea on exertion, irregular heartbeat, leg swelling, near-syncope, orthopnea, palpitations, paroxysmal nocturnal dyspnea and syncope.   Respiratory: Positive for shortness of breath. Negative for cough and hemoptysis.    Gastrointestinal: Negative for dysphagia, hematemesis and melena.   Genitourinary: Negative for hematuria.   All other systems reviewed and are negative.      Objective     Physical Exam:  No data found.  Physical Exam   Constitutional: He is oriented to person, place, and time. He appears well-nourished. He appears distressed.   HENT:   Head: Normocephalic.   Eyes: No scleral icterus.   Neck: Normal range of motion. Neck supple.   Cardiovascular: Normal rate, regular rhythm and normal heart sounds. Exam reveals no gallop and no friction rub.   No murmur heard.  Pulmonary/Chest: Effort normal and breath sounds normal. No respiratory distress. He has no wheezes. He has no rales.   Abdominal: Soft. Bowel sounds are normal. He exhibits no distension. There is no tenderness.   Musculoskeletal: He exhibits no edema.   Neurological: He is alert and oriented to person, place, and time.   Skin: Skin is warm. He is diaphoretic. No erythema.   Psychiatric: He has a normal mood and affect. His behavior is normal.       Results Review:   I reviewed the patient's  new clinical results.  Lab Results (last 24 hours)     ** No results found for the last 24 hours. **        Imaging Results (Last 24 Hours)     ** No results found for the last 24 hours. **        No results found for: SCARLETT  I have reviewed his ECG which reveals SR with Anterior ST elevation    Assessment/Plan     * No active hospital problems. *    New Problems that need assessment:  1. Ant STEMI, plan emergent PCI. The risk and potential complications as well as anticipated benefits were discussed with the patient and he wishes to proceed with the planned procedure  2. HLD  New problems that are stable  None identified    MDM High Complexity                Electronically signed by Wale Liz MD at 06/14/20 1313       Emergency Department Notes    No notes of this type exist for this encounter.         Vital Signs (last day)     Date/Time   Temp   Temp src   Pulse   Resp   BP   Patient Position   SpO2    06/15/20 1009   98.3 (36.8)   Oral   64   16   107/59   Sitting   94    06/15/20 0900   --   --   67   --   105/62   --   98    06/15/20 0800   98 (36.7)   Axillary   72   --   111/69   --   99    06/15/20 0700   --   --   68   --   --   --   98    06/15/20 0638   --   --   53   14   --   --   99    06/15/20 0600   --   --   71   --   126/75   --   97    06/15/20 0400   --   --   77   --   106/85   --   98    06/15/20 0300   --   --   57   --   103/76   --   98    06/15/20 0200   --   --   64   --   113/73   --   99    06/15/20 0100   --   --   58   --   102/61   --   96    06/15/20 0000   --   --   65   --   96/58   --   95    06/14/20 2300   --   --   63   --   108/67   --   97    06/14/20 2200   --   --   57   --   100/71   --   97    06/14/20 2100   --   --   63   --   111/75   --   96    06/14/20 2034   --   --   66   --   110/60   --   --    06/14/20 2000   98.3 (36.8)   Oral   77   --   122/70   --   95    06/14/20 1900   --   --   66   16   94/49   --   95    06/14/20 1830   --   --   69   --    103/55   --   97    06/14/20 1800   --   --   75   18   96/68   --   99    06/14/20 1730   --   --   68   --   98/48   --   96    06/14/20 1700   --   --   75   16   123/68   --   98    06/14/20 1645   --   --   75   --   119/64   --   98    06/14/20 1630   --   --   80   --   123/72   --   97    06/14/20 1615   --   --   75   --   130/81   --   97    06/14/20 1600   97.5 (36.4)   Axillary   66   17   112/75   --   96    06/14/20 1545   --   --   80   --   125/72   --   95    06/14/20 1530   --   --   79   --   117/75   --   95    06/14/20 1515   --   --   82   --   118/73   --   94    06/14/20 1500   --   --   79   --   108/67   --   95    06/14/20 1445   98.5 (36.9)   Oral   82   14   104/61   --   95    06/14/20 1430   98.4 (36.9)   Oral   88   13   104/66   --   97    06/14/20 1415   98.5 (36.9)   Oral   83   12   111/69   --   97    06/14/20 1410   98.5 (36.9)   Oral   85   14   114/71   --   97    06/14/20 1315   --   --   --   --   --   --   100                Facility-Administered Medications as of 6/15/2020   Medication Dose Route Frequency Provider Last Rate Last Dose   • acetaminophen (TYLENOL) tablet 650 mg  650 mg Oral Q4H PRN Wale Liz MD   650 mg at 06/14/20 2249   • aspirin EC tablet 81 mg  81 mg Oral Daily Wale Liz MD   81 mg at 06/15/20 0831   • atorvastatin (LIPITOR) tablet 40 mg  40 mg Oral Nightly Wale Liz MD   40 mg at 06/14/20 2034   • [COMPLETED] bivalirudin (ANGIOMAX) 250 mg/50 mL (5 mg/mL) infusion MBP    Continuous PRN Wale Liz MD 31.7 mL/hr at 06/14/20 1332 1.75 mg/kg/hr at 06/14/20 1332   • heparin infusion 2 units/mL in 0.9% NaCl    PRN Wale Liz MD   1,000 mL at 06/14/20 1342   • iopamidol (ISOVUE-370) 76 % injection    PRN Wale Liz MD   162 mL at 06/14/20 1345   • lidocaine (cardiac) (XYLOCAINE) injection    PRN Wale Liz MD   10 mL at 06/14/20 1323   • metoprolol tartrate (LOPRESSOR) tablet 25 mg  25 mg Oral Q12H Wale Liz MD   25 mg at  06/15/20 08   • [COMPLETED] perflutren (DEFINITY) lipid microspheres injection 1.1736 mg  1.1736 mg Intravenous Once Wale Liz MD       • sodium chloride 0.9 % flush 10 mL  10 mL Intravenous Q12H Wale Liz MD   10 mL at 06/15/20 0831   • sodium chloride 0.9 % flush 10 mL  10 mL Intravenous PRN Wale Liz MD       • [] sodium chloride 0.9 % infusion  125 mL/hr Intravenous Continuous Wale Liz  mL/hr at 20 1437 125 mL/hr at 20 1437   • ticagrelor (BRILINTA) tablet 90 mg  90 mg Oral BID Wale Liz MD   90 mg at 06/15/20 0831       Lab Results (last 24 hours)     Procedure Component Value Units Date/Time    Basic Metabolic Panel [[id number]]  (Abnormal) Collected:  06/15/20 0433    Specimen:  Blood Updated:  06/15/20 0529     Glucose 106 mg/dL      BUN 13 mg/dL      Creatinine 0.77 mg/dL      Sodium 140 mmol/L      Potassium 4.2 mmol/L      Chloride 106 mmol/L      CO2 24.0 mmol/L      Calcium 8.9 mg/dL      eGFR Non African Amer 103 mL/min/1.73      BUN/Creatinine Ratio 16.9     Anion Gap 10.0 mmol/L     Narrative:       GFR Normal >60  Chronic Kidney Disease <60  Kidney Failure <15      Lipid Panel [[id number]] Collected:  06/15/20 0433    Specimen:  Blood Updated:  06/15/20 0529     Total Cholesterol 149 mg/dL      Triglycerides 80 mg/dL      HDL Cholesterol 45 mg/dL      LDL Cholesterol  88 mg/dL      VLDL Cholesterol 16 mg/dL      LDL/HDL Ratio 1.96    Narrative:       Cholesterol Reference Ranges  (U.S. Department of Health and Human Services ATP III Classifications)    Desirable          <200 mg/dL  Borderline High    200-239 mg/dL  High Risk          >240 mg/dL      Triglyceride Reference Ranges  (U.S. Department of Health and Human Services ATP III Classifications)    Normal           <150 mg/dL  Borderline High  150-199 mg/dL  High             200-499 mg/dL  Very High        >500 mg/dL    HDL Reference Ranges  (U.S. Department of Health and Human Services ATP III  Classifcations)    Low     <40 mg/dl (major risk factor for CHD)  High    >60 mg/dl ('negative' risk factor for CHD)        LDL Reference Ranges  (U.S. Department of Health and Human Services ATP III Classifcations)    Optimal          <100 mg/dL  Near Optimal     100-129 mg/dL  Borderline High  130-159 mg/dL  High             160-189 mg/dL  Very High        >189 mg/dL    Hemoglobin A1c [[id number]]  (Normal) Collected:  06/15/20 0433    Specimen:  Blood Updated:  06/15/20 0529     Hemoglobin A1C 5.30 %     Narrative:       Hemoglobin A1C Ranges:    Increased Risk for Diabetes  5.7% to 6.4%  Diabetes                     >= 6.5%  Diabetic Goal                < 7.0%    CBC (No Diff) [[id number]]  (Normal) Collected:  06/15/20 0433    Specimen:  Blood Updated:  06/15/20 0505     WBC 9.96 10*3/mm3      RBC 5.00 10*6/mm3      Hemoglobin 13.8 g/dL      Hematocrit 42.8 %      MCV 85.6 fL      MCH 27.6 pg      MCHC 32.2 g/dL      RDW 13.3 %      RDW-SD 41.4 fl      MPV 11.0 fL      Platelets 250 10*3/mm3     Troponin [[id number]]  (Abnormal) Collected:  06/14/20 2002    Specimen:  Blood Updated:  06/14/20 2035     Troponin T 6.980 ng/mL     Narrative:       Troponin T Reference Range:  <= 0.03 ng/mL-   Negative for AMI  >0.03 ng/mL-     Abnormal for myocardial necrosis.  Clinicians would have to utilize clinical acumen, EKG, Troponin and serial changes to determine if it is an Acute Myocardial Infarction or myocardial injury due to an underlying chronic condition.       Results may be falsely decreased if patient taking Biotin.      Troponin [[id number]]  (Abnormal) Collected:  06/14/20 1507    Specimen:  Blood Updated:  06/14/20 1610     Troponin T 2.480 ng/mL     Narrative:       Troponin T Reference Range:  <= 0.03 ng/mL-   Negative for AMI  >0.03 ng/mL-     Abnormal for myocardial necrosis.  Clinicians would have to utilize clinical acumen, EKG, Troponin and serial changes to determine if it is an Acute Myocardial  Infarction or myocardial injury due to an underlying chronic condition.       Results may be falsely decreased if patient taking Biotin.          Imaging Results (Last 24 Hours)     ** No results found for the last 24 hours. **        ECG/EMG Results (last 24 hours)     Procedure Component Value Units Date/Time    ECG 12 Lead [[id number]] Collected:  06/14/20 1420     Updated:  06/14/20 1421    Narrative:       Test Reason : Post-cath  Blood Pressure : **/** mmHG  Vent. Rate : 083 BPM     Atrial Rate : 083 BPM     P-R Int : 188 ms          QRS Dur : 092 ms      QT Int : 358 ms       P-R-T Axes : 062 052 051 degrees     QTc Int : 420 ms    Normal sinus rhythm  Anterior infarct , age undetermined  Abnormal ECG  When compared with ECG of 07-JUL-2008 14:06,  Premature ventricular complexes are no longer Present    Referred By:  DR. PENNY           Confirmed By:     ECG 12 Lead [[id number]] Collected:  06/15/20 0507     Updated:  06/15/20 0509    Narrative:       Test Reason : STEMI  Blood Pressure : **/** mmHG  Vent. Rate : 065 BPM     Atrial Rate : 065 BPM     P-R Int : 182 ms          QRS Dur : 092 ms      QT Int : 382 ms       P-R-T Axes : 061 018 044 degrees     QTc Int : 397 ms    Normal sinus rhythm  Low voltage QRS, consider pulmonary disease, pericardial effusion, or  normal variant  Cannot rule out Anterior infarct (cited on or before 14-JUN-2020)  Abnormal ECG  When compared with ECG of 14-JUN-2020 14:20,  ST more elevated in Anterior leads    Referred By:  FORD           Confirmed By:     Echocardiogram 2D Complete [[id number]] Collected:  06/15/20 0938     Updated:  06/15/20 1240     BSA 2.1 m^2      IVSd 0.94 cm      LVIDd 4.1 cm      LVIDs 2.4 cm      LVPWd 1.0 cm      IVS/LVPW 0.93     FS 41.4 %      EDV(Teich) 74.7 ml      ESV(Teich) 20.4 ml      EF(Teich) 72.7 %      EDV(cubed) 69.4 ml      ESV(cubed) 14.0 ml      EF(cubed) 79.8 %      LV mass(C)d 129.3 grams      LV mass(C)dI 60.7 grams/m^2      SV(Teich)  54.3 ml      SI(Teich) 25.5 ml/m^2      SV(cubed) 55.4 ml      SI(cubed) 26.0 ml/m^2      Ao root diam 3.5 cm      Ao root area 9.6 cm^2      LA dimension 3.8 cm      LA/Ao 1.1     LVOT diam 2.2 cm      LVOT area 3.8 cm^2      LVOT area(traced) 3.8 cm^2      LVLd ap4 8.9 cm      EDV(MOD-sp4) 153.0 ml      LVLs ap4 7.7 cm      ESV(MOD-sp4) 64.6 ml      EF(MOD-sp4) 57.8 %      SV(MOD-sp4) 88.4 ml      SI(MOD-sp4) 41.5 ml/m^2      Ao root area (BSA corrected) 1.6     LV Pedraza Vol (BSA corrected) 71.8 ml/m^2      LV Sys Vol (BSA corrected) 30.3 ml/m^2      MV E max davis 75.2 cm/sec      MV A max davis 58.2 cm/sec      MV E/A 1.3     MV dec time 0.18 sec      Ao pk davis 118.0 cm/sec      Ao max PG 5.6 mmHg      Ao max PG (full) 1.9 mmHg      Ao V2 mean 69.6 cm/sec      Ao mean PG 2.0 mmHg      Ao mean PG (full) -1.0 mmHg      Ao V2 VTI 21.0 cm      RAZIA(I,A) 4.0 cm^2      RAZIA(I,D) 4.0 cm^2      RAZIA(V,A) 3.0 cm^2      RAZIA(V,D) 3.0 cm^2      LV V1 max PG 3.7 mmHg      LV V1 mean PG 3.0 mmHg      LV V1 max 94.5 cm/sec      LV V1 mean 74.3 cm/sec      LV V1 VTI 21.9 cm      MR max davis 281.0 cm/sec      MR max PG 36.0 mmHg      MR mean davis 301.0 cm/sec      MR mean PG 40.0 mmHg      MR .0 cm      SV(Ao) 201.6 ml      SI(Ao) 94.6 ml/m^2      SV(LVOT) 83.2 ml      SI(LVOT) 39.1 ml/m^2      TR max davis 239.0 cm/sec      BH CV ECHO SILVANO - BZI_BMI 27.1 kilograms/m^2       CV ECHO SILVANO - BSA([facility]) 2.2 m^2       CV ECHO SILVANO - BZI_METRIC_WEIGHT 90.7 kg       CV ECHO SILVANO - BZI_METRIC_HEIGHT 182.9 cm      Target HR (85%) 137 bpm      Max. Pred. HR (100%) 161 bpm      LA volume 65.4 cm3      LA Volume Index 30.7 mL/m2      Avg E/e' ratio 7.56     Lat Peak E' Davis 10.6 cm/sec      Med Peak E' Davis 9.30 cm/sec      Echo EF Estimated 55 %     Narrative:       · The following left ventricular wall segments are hypokinetic: apex   hypokinetic. The following left ventricular wall segments are akinetic:   apical septal.  · Estimated  EF = 55%.  · Left ventricular systolic function is normal.  · Left atrial cavity size is borderline dilated.             Orders (last 24 hrs)      Start     Ordered    06/15/20 1000  perflutren (DEFINITY) lipid microspheres injection 1.1736 mg  Once      06/15/20 0909    06/15/20 0736  Transfer Patient  Once      06/15/20 0735    06/15/20 0600  CBC (No Diff)  Morning Draw      06/14/20 1415    06/15/20 0600  Basic Metabolic Panel  Morning Draw      06/14/20 1415    06/15/20 0600  Hemoglobin A1c  Morning Draw      06/14/20 1415    06/15/20 0600  Lipid Panel  Morning Draw     Comments:  Fasting      06/14/20 1415    06/15/20 0500  ECG 12 Lead  Tomorrow AM      06/14/20 1415    06/15/20 0000  Echocardiogram 2D Complete  Once      06/14/20 1415    06/14/20 2100  sodium chloride 0.9 % flush 10 mL  Every 12 Hours Scheduled      06/14/20 1415    06/14/20 2100  ticagrelor (BRILINTA) tablet 90 mg  2 Times Daily      06/14/20 1415    06/14/20 2100  metoprolol tartrate (LOPRESSOR) tablet 25 mg  Every 12 Hours Scheduled      06/14/20 1415    06/14/20 2100  atorvastatin (LIPITOR) tablet 40 mg  Nightly      06/14/20 1415    06/14/20 1600  Strict intake and output  Every 4 Hours      06/14/20 1415    06/14/20 1600  Incentive Spirometry  Every 2 Hours While Awake      06/14/20 1415    06/14/20 1515  sodium chloride 0.9 % infusion  Continuous      06/14/20 1415    06/14/20 1515  aspirin EC tablet 81 mg  Daily      06/14/20 1415    06/14/20 1416  Vital Signs Every 15 Minutes Until Stable, Then Every 4 Hours  Continuous      06/14/20 1415    06/14/20 1416  Cardiac Monitoring  Continuous      06/14/20 1415    06/14/20 1416  Intake & Output  Every Shift      06/14/20 1415    06/14/20 1416  Weigh Patient  Once      06/14/20 1415    06/14/20 1416  Obtain STAT EKG during chest pain. Notify MD of any change in rhythm, ST segments or complaints of chest pain.  Until Discontinued      06/14/20 1415    06/14/20 1416  Encourage fluids  Until  Discontinued      06/14/20 1415    06/14/20 1416  Closure Device Used  Once      06/14/20 1415    06/14/20 1416  Assess Puncture Site, Vital Signs, Distal Pulses & Observe Site for Bleeding or Swelling  Per Order Details     Comments:  Every 15 Minutes x4, Every 30 Minutes x4, & Every 1 Hour x2    06/14/20 1415    06/14/20 1416  Verify Discontinuation of enoxaparin (LOVENOX) and / or heparin  Once      06/14/20 1415    06/14/20 1416  Hold metFORMIN (GLUCOPHAGE) for 48 Hours  Continuous      06/14/20 1415    06/14/20 1416  Oxygen Therapy- Nasal Cannula; Titrate for SPO2: 90% - 95%  Continuous,   Status:  Canceled      06/14/20 1415    06/14/20 1416  Oxygen Therapy- Nasal Cannula; Titrate for SPO2: equal to or greater than, 92%, per policy  Continuous      06/14/20 1415    06/14/20 1416  Continuous Pulse Oximetry  Continuous      06/14/20 1415    06/14/20 1416  Advance Diet as Tolerated  Until Discontinued      06/14/20 1415    06/14/20 1416  Troponin  Now Then Every 6 Hours     Comments:  Perform First Draw 6 Hours After Last Troponin in ED      06/14/20 1415    06/14/20 1416  ECG 12 Lead  STAT      06/14/20 1415    06/14/20 1416  Insert Peripheral IV  Once      06/14/20 1415    06/14/20 1416  Saline Lock & Maintain IV Access  Continuous      06/14/20 1415    06/14/20 1416  Inpatient Admission  Once      06/14/20 1415    06/14/20 1416  Code Status and Medical Interventions:  Continuous      06/14/20 1415    06/14/20 1416  VTE Prophylaxis Not Indicated: Other: Patient Currently Anticoagulated / Receiving Prophylaxis  Once      06/14/20 1415    06/14/20 1416  Diet Regular; Cardiac  Diet Effective Now      06/14/20 1415    06/14/20 1416  Cardiac Rehab Evaluation and Enrollment  Once     Provider:  (Not yet assigned)    06/14/20 1415    06/14/20 1415  sodium chloride 0.9 % flush 10 mL  As Needed      06/14/20 1415    06/14/20 1415  acetaminophen (TYLENOL) tablet 650 mg  Every 4 Hours PRN      06/14/20 1415    06/14/20  1349  ticagrelor (BRILINTA) tablet  As Needed,   Status:  Discontinued      06/14/20 1349    06/14/20 1332  bivalirudin (ANGIOMAX) 250 mg/50 mL (5 mg/mL) infusion MBP  Continuous PRN      06/14/20 1332    06/14/20 1329  Bivalirudin Trifluoroacetate (ANGIOMAX) injection  As Needed,   Status:  Discontinued      06/14/20 1329    06/14/20 1318  midazolam (VERSED) injection  As Needed,   Status:  Discontinued      06/14/20 1337    06/14/20 1317  fentaNYL citrate (PF) (SUBLIMAZE) injection  As Needed,   Status:  Discontinued      06/14/20 1336    06/14/20 1316  diphenhydrAMINE (BENADRYL) injection  As Needed,   Status:  Discontinued      06/14/20 1336    06/14/20 1305  Cardiac Catheterization/Vascular Study  Once      06/14/20 1258    06/14/20 1303  Send To Cath Lab  Once      06/14/20 1303    Unscheduled  Vital Signs  As Needed      06/14/20 1415    Unscheduled  Change site dressing  As Needed      06/14/20 1415    Unscheduled  Head of Bed: Flat; 2 Hours; Elevate Head of Bed: 30 Degrees; 3 Hours; Keep Affected Extremity/ Extremities Straight; Total Bedrest Time? (5)  As Needed      06/14/20 1415    Unscheduled  ECG 12 Lead  As Needed     Comments:  Nurse to Release For Unrelieved or New Chest Pain    06/14/20 1415    --  SCANNED - TELEMETRY        06/14/20 0000    --  SCANNED - TELEMETRY        06/14/20 0000    --  SCANNED - TELEMETRY        06/14/20 0000                   Physician Progress Notes (last 24 hours) (Notes from 06/14/20 1242 through 06/15/20 1242)      Wale Liz MD at 06/15/20 0789            Referring Provider: Wale Liz MD    Length of Stay: 1    Chief Complaint:   Chief Complaint   Patient presents with   • STEMI       Subjective: No chest pain    Medications  Current Facility-Administered Medications   Medication Dose Route Frequency Provider Last Rate Last Dose   • acetaminophen (TYLENOL) tablet 650 mg  650 mg Oral Q4H PRN Wale Liz MD   650 mg at 06/14/20 8071   • aspirin EC tablet 81 mg   81 mg Oral Daily Wale Liz MD   81 mg at 06/14/20 1635   • atorvastatin (LIPITOR) tablet 40 mg  40 mg Oral Nightly Wale Liz MD   40 mg at 06/14/20 2034   • metoprolol tartrate (LOPRESSOR) tablet 25 mg  25 mg Oral Q12H Wale Liz MD   25 mg at 06/14/20 2034   • sodium chloride 0.9 % flush 10 mL  10 mL Intravenous Q12H Wale Liz MD   10 mL at 06/14/20 2034   • sodium chloride 0.9 % flush 10 mL  10 mL Intravenous PRN Wale Liz MD       • ticagrelor (BRILINTA) tablet 90 mg  90 mg Oral BID Wale Liz MD   90 mg at 06/14/20 2034     Facility-Administered Medications Ordered in Other Encounters   Medication Dose Route Frequency Provider Last Rate Last Dose   • heparin infusion 2 units/mL in 0.9% NaCl    PRN Wale Liz MD   1,000 mL at 06/14/20 1342   • iopamidol (ISOVUE-370) 76 % injection    PRN Wale Liz MD   162 mL at 06/14/20 1345   • lidocaine (cardiac) (XYLOCAINE) injection    PRN Wale Liz MD   10 mL at 06/14/20 1323       No past medical history on file.,   No past surgical history on file.,   No family history on file.,   Social History     Tobacco Use   • Smoking status: Not on file   Substance Use Topics   • Alcohol use: Not on file   • Drug use: Not on file   ,    Review of Systems  Review of Systems   HENT: Negative for nosebleeds.    Cardiovascular: Negative for chest pain, claudication, dyspnea on exertion, irregular heartbeat, leg swelling, near-syncope, orthopnea, palpitations, paroxysmal nocturnal dyspnea and syncope.   Respiratory: Negative for cough, hemoptysis and shortness of breath.    Gastrointestinal: Negative for dysphagia, hematemesis and melena.   Genitourinary: Negative for hematuria.   All other systems reviewed and are negative.      Objective     Physical Exam:  Patient Vitals for the past 24 hrs:   BP Temp Temp src Pulse Resp SpO2 Height Weight   06/15/20 0700 -- -- -- 68 -- 98 % -- --   06/15/20 0638 -- -- -- 53 14 99 % -- --   06/15/20 0600  "126/75 -- -- 71 -- 97 % -- 90.9 kg (200 lb 6.4 oz)   06/15/20 0400 106/85 -- -- 77 -- 98 % -- --   06/15/20 0300 103/76 -- -- 57 -- 98 % -- --   06/15/20 0200 113/73 -- -- 64 -- 99 % -- --   06/15/20 0100 102/61 -- -- 58 -- 96 % -- --   06/15/20 0000 96/58 -- -- 65 -- 95 % -- --   06/14/20 2300 108/67 -- -- 63 -- 97 % -- --   06/14/20 2200 100/71 -- -- 57 -- 97 % -- --   06/14/20 2100 111/75 -- -- 63 -- 96 % -- --   06/14/20 2034 110/60 -- -- 66 -- -- -- --   06/14/20 2000 122/70 98.3 °F (36.8 °C) Oral 77 -- 95 % -- --   06/14/20 1900 94/49 -- -- 66 16 95 % -- --   06/14/20 1830 103/55 -- -- 69 -- 97 % -- --   06/14/20 1800 96/68 -- -- 75 18 99 % -- --   06/14/20 1730 98/48 -- -- 68 -- 96 % -- --   06/14/20 1700 123/68 -- -- 75 16 98 % -- --   06/14/20 1645 119/64 -- -- 75 -- 98 % -- --   06/14/20 1630 123/72 -- -- 80 -- 97 % -- --   06/14/20 1615 130/81 -- -- 75 -- 97 % -- --   06/14/20 1600 112/75 97.5 °F (36.4 °C) Axillary 66 17 96 % -- --   06/14/20 1545 125/72 -- -- 80 -- 95 % -- --   06/14/20 1530 117/75 -- -- 79 -- 95 % -- --   06/14/20 1515 118/73 -- -- 82 -- 94 % -- --   06/14/20 1500 108/67 -- -- 79 -- 95 % -- --   06/14/20 1445 104/61 98.5 °F (36.9 °C) Oral 82 14 95 % -- --   06/14/20 1430 104/66 98.4 °F (36.9 °C) Oral 88 13 97 % -- --   06/14/20 1415 111/69 98.5 °F (36.9 °C) Oral 83 12 97 % -- --   06/14/20 1410 114/71 98.5 °F (36.9 °C) Oral 85 14 97 % -- --   06/14/20 1325 -- -- -- -- -- -- 182.9 cm (72\") 90.7 kg (200 lb)   06/14/20 1315 -- -- -- -- -- 100 % -- --     Physical Exam   Constitutional: He is oriented to person, place, and time. He appears well-nourished. No distress.   HENT:   Head: Normocephalic.   Eyes: No scleral icterus.   Neck: Normal range of motion. Neck supple.   Cardiovascular: Normal rate, regular rhythm and normal heart sounds. Exam reveals no gallop and no friction rub.   No murmur heard.  Pulmonary/Chest: Effort normal and breath sounds normal. No respiratory distress. He has " no wheezes. He has no rales.   Abdominal: Soft. Bowel sounds are normal. He exhibits no distension. There is no tenderness.   Musculoskeletal: He exhibits no edema.   Neurological: He is alert and oriented to person, place, and time.   Skin: Skin is warm and dry. He is not diaphoretic. No erythema.   Psychiatric: He has a normal mood and affect. His behavior is normal.   no hematoma    Results Review:   I reviewed the patient's new clinical results.  Lab Results (last 24 hours)     Procedure Component Value Units Date/Time    Basic Metabolic Panel [[id number]]  (Abnormal) Collected:  06/15/20 0433    Specimen:  Blood Updated:  06/15/20 0529     Glucose 106 mg/dL      BUN 13 mg/dL      Creatinine 0.77 mg/dL      Sodium 140 mmol/L      Potassium 4.2 mmol/L      Chloride 106 mmol/L      CO2 24.0 mmol/L      Calcium 8.9 mg/dL      eGFR Non African Amer 103 mL/min/1.73      BUN/Creatinine Ratio 16.9     Anion Gap 10.0 mmol/L     Narrative:       GFR Normal >60  Chronic Kidney Disease <60  Kidney Failure <15      Lipid Panel [[id number]] Collected:  06/15/20 0433    Specimen:  Blood Updated:  06/15/20 0529     Total Cholesterol 149 mg/dL      Triglycerides 80 mg/dL      HDL Cholesterol 45 mg/dL      LDL Cholesterol  88 mg/dL      VLDL Cholesterol 16 mg/dL      LDL/HDL Ratio 1.96    Narrative:       Cholesterol Reference Ranges  (U.S. Department of Health and Human Services ATP III Classifications)    Desirable          <200 mg/dL  Borderline High    200-239 mg/dL  High Risk          >240 mg/dL      Triglyceride Reference Ranges  (U.S. Department of Health and Human Services ATP III Classifications)    Normal           <150 mg/dL  Borderline High  150-199 mg/dL  High             200-499 mg/dL  Very High        >500 mg/dL    HDL Reference Ranges  (U.S. Department of Health and Human Services ATP III Classifcations)    Low     <40 mg/dl (major risk factor for CHD)  High    >60 mg/dl ('negative' risk factor for CHD)        LDL  Reference Ranges  (U.S. Department of Health and Human Services ATP III Classifcations)    Optimal          <100 mg/dL  Near Optimal     100-129 mg/dL  Borderline High  130-159 mg/dL  High             160-189 mg/dL  Very High        >189 mg/dL    Hemoglobin A1c [[id number]]  (Normal) Collected:  06/15/20 0433    Specimen:  Blood Updated:  06/15/20 0529     Hemoglobin A1C 5.30 %     Narrative:       Hemoglobin A1C Ranges:    Increased Risk for Diabetes  5.7% to 6.4%  Diabetes                     >= 6.5%  Diabetic Goal                < 7.0%    CBC (No Diff) [[id number]]  (Normal) Collected:  06/15/20 0433    Specimen:  Blood Updated:  06/15/20 0505     WBC 9.96 10*3/mm3      RBC 5.00 10*6/mm3      Hemoglobin 13.8 g/dL      Hematocrit 42.8 %      MCV 85.6 fL      MCH 27.6 pg      MCHC 32.2 g/dL      RDW 13.3 %      RDW-SD 41.4 fl      MPV 11.0 fL      Platelets 250 10*3/mm3     Troponin [[id number]]  (Abnormal) Collected:  06/14/20 2002    Specimen:  Blood Updated:  06/14/20 2035     Troponin T 6.980 ng/mL     Narrative:       Troponin T Reference Range:  <= 0.03 ng/mL-   Negative for AMI  >0.03 ng/mL-     Abnormal for myocardial necrosis.  Clinicians would have to utilize clinical acumen, EKG, Troponin and serial changes to determine if it is an Acute Myocardial Infarction or myocardial injury due to an underlying chronic condition.       Results may be falsely decreased if patient taking Biotin.      Troponin [[id number]]  (Abnormal) Collected:  06/14/20 1507    Specimen:  Blood Updated:  06/14/20 1610     Troponin T 2.480 ng/mL     Narrative:       Troponin T Reference Range:  <= 0.03 ng/mL-   Negative for AMI  >0.03 ng/mL-     Abnormal for myocardial necrosis.  Clinicians would have to utilize clinical acumen, EKG, Troponin and serial changes to determine if it is an Acute Myocardial Infarction or myocardial injury due to an underlying chronic condition.       Results may be falsely decreased if patient taking  "Biotin.          No results found for: ECHOEFEST  Imaging Results (Last 24 Hours)     ** No results found for the last 24 hours. **          I have reviewed telemetry which reveals SR with persistent ST elevation in the anterior leads but improved overall    Assessment/Plan     ST elevation myocardial infarction involving left anterior descending (LAD) coronary artery (CMS/HCC)    STEMI (ST elevation myocardial infarction) (CMS/HCC)      New Problems that need treatment:  1. Ant STEMI, s/p MONAE. Much improved. Echo pending. Will move to 4B. Home tomorrow    Old problems that are stable:   HLD    Medical Decision Making: Moderate Complexity            Electronically signed by Wale Liz MD at 06/15/20 0735       Consult Notes (last 24 hours) (Notes from 20 1242 through 06/15/20 1242)    No notes of this type exist for this encounter.         [facility] CATH LAB  [address] [place] [id number] [phone]             Wale Valente   Cardiac Catheterization/Vascular Study   Order# [id number]   Reading physician: Wale Liz MD Ordering physician: Wale Liz MD Study date: 20    Procedures     Left Heart Cath      Patient Information     Patient Name  Wale Valente MRN  [id number] Sex  Male  (Age)  1960 (59 y.o.)   Race Ethnicity Encounter Category   White or  Not  or  Urgent   Patient Hx Of Height, Weight, and Vitals     Height Weight BSA (Calculated - sq m) BMI (kg/m2) Pulse BP   182.9 cm (72\") 90.7 kg (200 lb) 2.13 sq meters 27.18     Physicians     Panel Physicians Referring Physician Case Authorizing Physician   Wale Liz MD (Primary)     Admission Information     Admission Date/Time Discharge Date/Time Room/Bed   20  1303  437/1   Pre-procedure Diagnosis     STEMI       Conclusion     Procedure: Conscious sedation, Left heart catheterization, ventriculography, selective coronary angiography, and Acute Infarct PCI of the " LAD     Risk, Benefits, and Alternatives discussed with the patient and/or family.  Plan is for moderate sedation, and the patient agrees to proceed with the procedure.  An immediate assessment was done prior to the administration of moderate sedation.     Indications: STEMI  Type and site of entry: percutaneous right femoral artery using the Seldinger technique   Contrast used: Isovue  Catheters used: 5 [place] pigtail, JL4, JR4  Findings:   Hemodynamics:    #1 left ventricular pressure 120/200                                      #2 aortic pressure 120/50                                      #3 There was no significant gradient on aortic valve pullback     Ventriculography: Left ventricular left ventricular ejection fraction is 60%.  There is apical wall dyskinesis. No significant MR     Selective Coronary Angiography:  #1.  The left main coronary artery is an average size vessel with no significant disease  #2.  The left anterior descending coronary artery is totally occluded in the mid portion.  #3.  The left circumflex coronary artery is an average size vessel with one large obtuse marginal branch that bifurcates.  There is no significant disease noted.   #4  The right coronary artery is a dominant vessel with one PDA and one PL. There is no significant disease.     Acute Infarct PCI of the LAD  Guiding catheter used 6 [place] XBLAD3.5, guidewire 0.014 PT graphix  Comment: The wire was successfully advanced through the occlusion and advanced distally. Initially a 2.5 mm balloon was used to predilated the occlusion.  A 3.0 x 18 mm Xience drug-eluting stent was then deployed in the mid LAD at 18 jacinto for 30 seconds.  This was followed by a 3.0 x 8 mm Xience MONAE at 15 jacinto for 20 sec. This resulted in no residual stenosis and ARTEMIO-3 flow distally     Conscious Sedation  I supervised the administration of conscious sedation by the nursing staff. The first dose was given at 1320 and the end of the face to face encounter  was at 1344. During this time, BP, HR, oximetry, and neurologic status were closely monitored.     Estimated Blood Loss: none     Conclusion:  #1 status post successful acute infarct PCI of the LAD using 2 drug-eluting stents  #2 left anterior ejection fraction 60%        Final Comment:  Pt tolerated the procedure well. There were no obvious complications. Hemostasis was achieved with a 6F perclose hemostatic device. The patient was transferred to the CCU in stable condition

## 2020-06-16 ENCOUNTER — TELEPHONE (OUTPATIENT)
Dept: PRIMARY CARE CLINIC | Age: 60
End: 2020-06-16

## 2020-06-16 VITALS
RESPIRATION RATE: 16 BRPM | DIASTOLIC BLOOD PRESSURE: 63 MMHG | HEIGHT: 72 IN | BODY MASS INDEX: 26.73 KG/M2 | SYSTOLIC BLOOD PRESSURE: 108 MMHG | WEIGHT: 197.31 LBS | HEART RATE: 73 BPM | OXYGEN SATURATION: 96 % | TEMPERATURE: 98.7 F

## 2020-06-16 PROBLEM — I25.10 CORONARY ARTERY DISEASE INVOLVING NATIVE CORONARY ARTERY OF NATIVE HEART WITHOUT ANGINA PECTORIS: Status: ACTIVE | Noted: 2020-06-16

## 2020-06-16 PROBLEM — Z95.5 PRESENCE OF DRUG COATED STENT IN LAD CORONARY ARTERY: Status: ACTIVE | Noted: 2020-06-16

## 2020-06-16 PROBLEM — E78.5 HYPERLIPIDEMIA LDL GOAL <70: Status: ACTIVE | Noted: 2020-06-16

## 2020-06-16 LAB — GLUCOSE BLDC GLUCOMTR-MCNC: 121 MG/DL (ref 70–130)

## 2020-06-16 PROCEDURE — 99238 HOSP IP/OBS DSCHRG MGMT 30/<: CPT | Performed by: NURSE PRACTITIONER

## 2020-06-16 PROCEDURE — 82962 GLUCOSE BLOOD TEST: CPT

## 2020-06-16 RX ORDER — ATORVASTATIN CALCIUM 40 MG/1
40 TABLET, FILM COATED ORAL NIGHTLY
Qty: 90 TABLET | Refills: 3 | Status: SHIPPED | OUTPATIENT
Start: 2020-06-16 | End: 2021-04-27

## 2020-06-16 RX ORDER — ASPIRIN 81 MG/1
81 TABLET ORAL DAILY
Qty: 90 TABLET | Refills: 3 | Status: SHIPPED | OUTPATIENT
Start: 2020-06-17 | End: 2021-04-27

## 2020-06-16 RX ADMIN — TICAGRELOR 90 MG: 90 TABLET ORAL at 08:04

## 2020-06-16 RX ADMIN — SODIUM CHLORIDE, PRESERVATIVE FREE 10 ML: 5 INJECTION INTRAVENOUS at 08:04

## 2020-06-16 RX ADMIN — ASPIRIN 81 MG: 81 TABLET ORAL at 08:04

## 2020-06-16 RX ADMIN — METOPROLOL TARTRATE 25 MG: 25 TABLET, FILM COATED ORAL at 08:04

## 2020-06-16 NOTE — DISCHARGE SUMMARY
[facility]ART GROUP DISCHARGE    Date of Admission: 6/14/2020  Date of Discharge:  6/16/2020    Attending: Dr. Wale Liz    Discharge Diagnosis:     ST elevation myocardial infarction involving left anterior descending (LAD) coronary artery (CMS/HCC)    Coronary artery disease involving native coronary artery of native heart without angina pectoris    Presence of drug coated stent in LAD coronary artery    Hyperlipidemia LDL goal <70      Presenting Problem/History of Present Illness  STEMI (ST elevation myocardial infarction) (CMS/HCC) [I21.3]      Hospital Course  Patient is a 59 y.o. male who presented to  with a complaint of chest pain.  EKG revealed anterior ST elevation myocardial infarction.  He was taken emergently to the Cath Lab for left heart catheterization, which revealed a totally occluded mid left anterior descending artery.  This was treated with a 3.0 x 18 mm Xience drug-eluting stent followed by a 3.0 x 8 mm science through drug-eluting stent.  The procedure was well-tolerated without any obvious complications.  Post procedure 2D echo revealed hypokinetic apex and akinetic apical septal wall segments with normal left ventricular systolic function and ejection fraction of 55%.  He will be discharged home today.  He has been started on aspirin 81 mg daily, Brilinta 90 mg twice daily, metoprolol tartrate 25 mg twice daily and Lipitor 40 mg daily.  He has been referred to cardiac rehabilitation.  He will follow-up with his PCP in 1 week.  He will follow-up with a nurse practitioner in the heart group in 2 to 4 weeks.      Procedures Performed  Procedure(s):  Left Heart Cath      Wale Valente   Cardiac Catheterization/Vascular Study   Order# [id number]   Reading physician: Wlae Liz MD Ordering physician: Wale Liz MD Study date: 6/14/20    Procedures     Left Heart Cath      Patient Information     Patient Name  Wale Valente MRN  [id number]  "Sex  Male  (Age)  1960 (59 y.o.)   Race Ethnicity Encounter Category   White or  Not  or  Urgent   Patient Hx Of Height, Weight, and Vitals     Height Weight BSA (Calculated - sq m) BMI (kg/m2) Pulse BP   182.9 cm (72\") 90.7 kg (200 lb) 2.13 sq meters 27.18     Physicians     Panel Physicians Referring Physician Case Authorizing Physician   Wale Liz MD (Primary)     Admission Information     Admission Date/Time Discharge Date/Time Room/Bed   20  1303  437/1   Pre-procedure Diagnosis     STEMI       Conclusion     Procedure: Conscious sedation, Left heart catheterization, ventriculography, selective coronary angiography, and Acute Infarct PCI of the LAD     Risk, Benefits, and Alternatives discussed with the patient and/or family.  Plan is for moderate sedation, and the patient agrees to proceed with the procedure.  An immediate assessment was done prior to the administration of moderate sedation.     Indications: STEMI  Type and site of entry: percutaneous right femoral artery using the Seldinger technique   Contrast used: Isovue  Catheters used: 5 Taj[place] pigtail, JL4, JR4  Findings:   Hemodynamics:    #1 left ventricular pressure 120/200                                      #2 aortic pressure 120/50                                      #3 There was no significant gradient on aortic valve pullback     Ventriculography: Left ventricular left ventricular ejection fraction is 60%.  There is apical wall dyskinesis. No significant MR     Selective Coronary Angiography:  #1.  The left main coronary artery is an average size vessel with no significant disease  #2.  The left anterior descending coronary artery is totally occluded in the mid portion.  #3.  The left circumflex coronary artery is an average size vessel with one large obtuse marginal branch that bifurcates.  There is no significant disease noted.   #4  The right coronary artery is a dominant vessel with one PDA and one " PL. There is no significant disease.     Acute Infarct PCI of the LAD  Guiding catheter used 6 [place] XBLAD3.5, guidewire 0.014 PT graphix  Comment: The wire was successfully advanced through the occlusion and advanced distally. Initially a 2.5 mm balloon was used to predilated the occlusion.  A 3.0 x 18 mm Xience drug-eluting stent was then deployed in the mid LAD at 18 jacinto for 30 seconds.  This was followed by a 3.0 x 8 mm Xience MONAE at 15 jacinto for 20 sec. This resulted in no residual stenosis and ARTEMIO-3 flow distally     Conscious Sedation  I supervised the administration of conscious sedation by the nursing staff. The first dose was given at 1320 and the end of the face to face encounter was at 1344. During this time, BP, HR, oximetry, and neurologic status were closely monitored.     Estimated Blood Loss: none     Conclusion:  #1 status post successful acute infarct PCI of the LAD using 2 drug-eluting stents  #2 left anterior ejection fraction 60%        Final Comment:  Pt tolerated the procedure well. There were no obvious complications. Hemostasis was achieved with a 6F perclose hemostatic device. The patient was transferred to the CCU in stable condition        Pertinent Test Results: Wale Valente   Echo Complete w/ Doppler, Color Flow and Contrast   Order# [id number]   Reading physician: Wale Liz MD Ordering physician: Wale Liz MD Study date: 6/15/20   Patient Information     Patient Name  Wale Valente MRN  [id number] Sex  Male  (Age)  1960 (59 y.o.)   Admission Information     Admission Date/Time Discharge Date/Time Room/Bed   20  1303  437/1   Sedation Narrator Report     Sedation Narrator Report   Interpretation Summary     · The following left ventricular wall segments are hypokinetic: apex hypokinetic. The following left ventricular wall segments are akinetic: apical septal.  · Estimated EF = 55%.  · Left ventricular systolic function is normal.  · Left atrial cavity size is  "borderline dilated.      Patient Hx Of Height, Weight, and Vitals     Height Weight BSA (Calculated - sq m) BMI (kg/m2) Pulse BP   182.9 cm (72\") 89.5 kg (197 lb 5 oz) 2.12 sq meters 26.82 64 107/59   Reason for Exam     Acute MI   Cardiac History     No past medical history on file.   Study Description     2D Echo with Colorflow, Doppler and Contrast. The study is technically difficult for diagnosis.Verbal consent was obtained from the patient to use Definity contrast in order to optimize the study.   No adverse reaction to contrast was noted.   Echocardiogram Findings     Left Ventricle Left ventricular systolic function is normal. Estimated EF = 55%. Normal left ventricular cavity size and wall thickness noted.   Right Ventricle Normal right ventricular cavity size and systolic function noted.   Left Atrium Left atrial cavity size is borderline dilated.   Right Atrium Normal right atrial size noted.   Aortic Valve The aortic valve is grossly normal in structure. No aortic valve regurgitation is present. No aortic valve stenosis is present.   Mitral Valve The mitral valve is grossly normal in structure. Trace mitral valve regurgitation is present. No significant mitral valve stenosis is present.   Tricuspid Valve The tricuspid valve is grossly normal. No evidence of tricuspid valve stenosis is present. Trace tricuspid valve regurgitation is present.   Pulmonic Valve The pulmonic valve is grossly normal in structure. There is no significant pulmonic valve stenosis present. There is no pulmonic valve regurgitation present.   Greater Vessels No dilation of the aortic root is present. No dilation of the sinuses of Valsalva is present.   Pericardium There is no evidence of pericardial effusion.      Wall Scoring     Score Index: 1.18              The following segments are akinetic: apical septal.  The following segments are hypokinetic: apex.  All other segments are normal.               LV Measurements     Dimensions "   LVIDd 4.1 cm      IVSd 0.94 cm      FS 41.4 %      ESV(cubed) 14 ml      EDV(cubed) 69.4 ml      LVOT area 3.8 cm^2      LVOT diam 2.2 cm      Diastolic Filling   MV E max davis 75.2 cm/sec      MV A max davis 58.2 cm/sec      MV dec time 0.18 sec      MV E/A 1.3       LA Volume Index 30.7 mL/m2      Med Peak E' Davis 9.3 cm/sec      Lat Peak E' Davis 10.6 cm/sec      Avg E/e' ratio 7.56       Shunt Ratio   SV(LVOT) 83.2 ml       Dimensions   LVIDs 2.4 cm      LVPWd 1 cm      IVS/LVPW 0.93       LV Sys Vol (BSA corrected) 30.3 ml/m^2      LV Pedraza Vol (BSA corrected) 71.8 ml/m^2      LV mass(C)d 129.3 grams      EDV(MOD-sp4) 153 ml      ESV(MOD-sp4) 64.6 ml      Systolic Function   SV(MOD-sp4) 88.4 ml      SI(MOD-sp4) 41.5 ml/m^2      EF(MOD-sp4) 57.8 %         LA Measurements     LA Dimensions   LA dimension 3.8 cm      LA volume 65.4 cm3      LA Volume Index 30.7 mL/m2         Aortic Valve Measurements     Stenosis   LVOT diam 2.2 cm      LV V1 max 94.5 cm/sec      LV V1 max PG 3.7 mmHg      LV V1 mean PG 3 mmHg      LV V1 VTI 21.9 cm      Ao pk davis 118 cm/sec       Stenosis   Ao max PG 5.6 mmHg      Ao mean PG 2 mmHg      Ao V2 VTI 21 cm      RAZIA(I,D) 4 cm^2         Mitral Valve Measurements     Stenosis   MV dec time 0.18 sec       Regurgitation   MR max davis 281 cm/sec      MR max PG 36 mmHg      MR mean davis 301 cm/sec      MR mean PG 40 mmHg      MR  cm         Tricuspid Valve Measurements     Regurgitation   TR max davis 239 cm/sec      PISA   TR max davis 239 cm/sec          Greater Vessels Measurements     Ao root diam 3.5 cm      Ao root area (BSA corrected) 1.6              Condition on Discharge:  Stable    Physical Exam at Discharge  Patient Vitals for the past 24 hrs:   BP Temp Temp src Pulse Resp SpO2 Height Weight   06/16/20 0715 108/63 98.7 °F (37.1 °C) Oral 73 16 96 % -- --   06/16/20 0314 111/73 98.5 °F (36.9 °C) Oral 70 16 95 % -- --   06/16/20 0014 115/68 98.1 °F (36.7 °C) Oral 72 16 98 % -- --  "  06/15/20 2028 126/56 98.3 °F (36.8 °C) Oral 71 16 96 % -- --   06/15/20 1533 118/65 98 °F (36.7 °C) Oral 70 16 98 % -- --   06/15/20 1009 107/59 98.3 °F (36.8 °C) Oral 64 16 94 % 182.9 cm (72\") 89.5 kg (197 lb 5 oz)   06/15/20 0900 105/62 -- -- 67 -- 98 % -- --     Telemetry: NSR 64-95    Physical Exam   Constitutional: He is oriented to person, place, and time. Vital signs are normal. He appears well-developed and well-nourished. No distress.   HENT:   Head: Normocephalic and atraumatic.   Right Ear: External ear normal.   Left Ear: External ear normal.   Eyes: Pupils are equal, round, and reactive to light. Conjunctivae are normal. Right eye exhibits no discharge. Left eye exhibits no discharge.   Neck: Normal range of motion. Neck supple. No JVD present. Carotid bruit is not present. No thyromegaly present.   Cardiovascular: Normal rate, regular rhythm, normal heart sounds and intact distal pulses. PMI is not displaced. Exam reveals no gallop, no friction rub and no decreased pulses.   No murmur heard.  Pulses:       Radial pulses are 2+ on the right side, and 2+ on the left side.        Dorsalis pedis pulses are 2+ on the right side, and 2+ on the left side.        Posterior tibial pulses are 2+ on the right side, and 2+ on the left side.   Pulmonary/Chest: Effort normal and breath sounds normal. No respiratory distress. He has no decreased breath sounds. He has no wheezes. He has no rhonchi. He has no rales. He exhibits no tenderness.   Abdominal: Soft. Bowel sounds are normal. He exhibits no distension. There is no tenderness.   Musculoskeletal: Normal range of motion. He exhibits no edema.   Neurological: He is alert and oriented to person, place, and time.   Skin: Skin is warm and dry. No rash noted. He is not diaphoretic. No erythema. No pallor.        Psychiatric: He has a normal mood and affect. His behavior is normal. Judgment and thought content normal.   Vitals reviewed.      Discharge Disposition: " Home      Discharge Medications     Discharge Medications      New Medications      Instructions Start Date   aspirin 81 MG EC tablet   81 mg, Oral, Daily   Start Date:  June 17, 2020     atorvastatin 40 MG tablet  Commonly known as:  LIPITOR   40 mg, Oral, Nightly      metoprolol tartrate 25 MG tablet  Commonly known as:  LOPRESSOR   25 mg, Oral, Every 12 Hours Scheduled      ticagrelor 90 MG tablet tablet  Commonly known as:  BRILINTA   90 mg, Oral, 2 Times Daily             Education: 1) No driving for 24 hours and no longer taking narcotics.   2) Return to school / work in 2 weeks.  3) May shower today. No tub bath or standing water for one week.   4) Do not lift / push / pull more then 10 lbs for one week.  5) Report any worsening symptoms.  6) Monitor cath site for signs of bleeding or infection: drainage, swelling, pain, redness, warmth or fever.     Discharge Diet:   Diet Instructions     Diet: Regular, Cardiac; Thin      Discharge Diet:   Regular  Cardiac       Fluid Consistency:  Thin          Activity at Discharge:   Activity Instructions     Other Activity Instructions      1) No driving for 24 hours and no longer taking narcotics.   2) Return to school / work in 2 weeks.  3) May shower today. No tub bath or standing water for one week.   4) Do not lift / push / pull more then 10 lbs for one week.  5) Report any worsening symptoms.  6) Monitor cath site for signs of bleeding or infection: drainage, swelling, pain, redness, warmth or fever.          Follow-up Appointments  No future appointments.         SENG Zaldivar  06/16/20  08:58    Time: 30 minutes

## 2020-06-16 NOTE — PAYOR COMM NOTE
"DC HOME 6-16-20  [id number]   [phone]    Sidra Wale (59 y.o. Male)     Date of Birth Social Security Number Address Home Phone MRN    1960  [id number] [place] [address] [phone] [id number]    M[employer]onism Marital Status          Other        Admission Date Admission Type Admitting Provider Attending Provider Department, Room/Bed    6/14/20 Urgent Wale Liz MD  [facility] 4B, 437/1    Discharge Date Discharge Disposition Discharge Destination        6/16/2020 Home or Self Care              Attending Provider:  (none)   Allergies:  No Known Allergies    Isolation:  None   Infection:  None   Code Status:  Prior    Ht:  182.9 cm (72\")   Wt:  89.5 kg (197 lb 5 oz)    Admission Cmt:  None   Principal Problem:  ST elevation myocardial infarction involving left anterior descending (LAD) coronary artery (CMS/[facility]) [I21.02]                 Active Insurance as of 6/14/2020     Primary Coverage     Payor Plan Insurance Group Employer/Plan Group    [facility] [employer] EM[employer] [id number]     Payor Plan Address Payor Plan Phone Number Payor Plan Fax Number Effective Dates    [address] [phone]  1/1/2015 - None Entered    [place] [address]       Subscriber Name Subscriber Birth Date Member ID       URSULA CANO 12/10/1963 [id number]                 Emergency Contacts      (Rel.) Home Phone Work Phone Mobile Phone    Ursula Cano (Spouse) -- -- [phone]    Jesse Cano (Son) -- -- [phone]    Yann Cano (Son) -- -- [phone]               Discharge Summary      Khushbu Justin APRN at 06/16/20 0831     Attestation signed by Wale Liz MD at 06/16/20 0919    I have reviewed the note below and agree with discharge                  [facility] DISCHARGE    Date of Admission: 6/14/2020  Date of Discharge:  6/16/2020    Attending: Dr. Wale Liz    Discharge Diagnosis:     ST " elevation myocardial infarction involving left anterior descending (LAD) coronary artery (CMS/HCC)    Coronary artery disease involving native coronary artery of native heart without angina pectoris    Presence of drug coated stent in LAD coronary artery    Hyperlipidemia LDL goal <70      Presenting Problem/History of Present Illness  STEMI (ST elevation myocardial infarction) (CMS/HCC) [I21.3]      Hospital Course  Patient is a 59 y.o. male who presented to [facility] with a complaint of chest pain.  EKG revealed anterior ST elevation myocardial infarction.  He was taken emergently to the Cath Lab for left heart catheterization, which revealed a totally occluded mid left anterior descending artery.  This was treated with a 3.0 x 18 mm Xience drug-eluting stent followed by a 3.0 x 8 mm science through drug-eluting stent.  The procedure was well-tolerated without any obvious complications.  Post procedure 2D echo revealed hypokinetic apex and akinetic apical septal wall segments with normal left ventricular systolic function and ejection fraction of 55%.  He will be discharged home today.  He has been started on aspirin 81 mg daily, Brilinta 90 mg twice daily, metoprolol tartrate 25 mg twice daily and Lipitor 40 mg daily.  He has been referred to cardiac rehabilitation.  He will follow-up with his PCP in 1 week.  He will follow-up with a nurse practitioner in the heart group in 2 to 4 weeks.      Procedures Performed  Procedure(s):  Left Heart Cath      Wale Valente   Cardiac Catheterization/Vascular Study   Order# [id number]   Reading physician: Wale Liz MD Ordering physician: Wale Liz MD Study date: 20    Procedures     Left Heart Cath      Patient Information     Patient Name  Wale Valente MRN  [id number] Sex  Male  (Age)  1960 (59 y.o.)   Race Ethnicity Encounter Category   White or  Not  or  Urgent   Patient Hx Of Height, Weight, and Vitals     Height  "Weight BSA (Calculated - sq m) BMI (kg/m2) Pulse BP   182.9 cm (72\") 90.7 kg (200 lb) 2.13 sq meters 27.18     Physicians     Panel Physicians Referring Physician Case Authorizing Physician   Wale Liz MD (Primary)     Admission Information     Admission Date/Time Discharge Date/Time Room/Bed   06/14/20  1303  437/1   Pre-procedure Diagnosis     STEMI       Conclusion     Procedure: Conscious sedation, Left heart catheterization, ventriculography, selective coronary angiography, and Acute Infarct PCI of the LAD     Risk, Benefits, and Alternatives discussed with the patient and/or family.  Plan is for moderate sedation, and the patient agrees to proceed with the procedure.  An immediate assessment was done prior to the administration of moderate sedation.     Indications: STEMI  Type and site of entry: percutaneous right femoral artery using the Seldinger technique   Contrast used: Isovue  Catheters used: 5 [place]aguayan pigtail, JL4, JR4  Findings:   Hemodynamics:    #1 left ventricular pressure 120/200                                      #2 aortic pressure 120/50                                      #3 There was no significant gradient on aortic valve pullback     Ventriculography: Left ventricular left ventricular ejection fraction is 60%.  There is apical wall dyskinesis. No significant MR     Selective Coronary Angiography:  #1.  The left main coronary artery is an average size vessel with no significant disease  #2.  The left anterior descending coronary artery is totally occluded in the mid portion.  #3.  The left circumflex coronary artery is an average size vessel with one large obtuse marginal branch that bifurcates.  There is no significant disease noted.   #4  The right coronary artery is a dominant vessel with one PDA and one PL. There is no significant disease.     Acute Infarct PCI of the LAD  Guiding catheter used 6 [place] XBLAD3.5, guidewire 0.014 PT graphix  Comment: The wire was successfully " "advanced through the occlusion and advanced distally. Initially a 2.5 mm balloon was used to predilated the occlusion.  A 3.0 x 18 mm Xience drug-eluting stent was then deployed in the mid LAD at 18 jacinto for 30 seconds.  This was followed by a 3.0 x 8 mm Xience MONAE at 15 jacinto for 20 sec. This resulted in no residual stenosis and ARTEMIO-3 flow distally     Conscious Sedation  I supervised the administration of conscious sedation by the nursing staff. The first dose was given at 1320 and the end of the face to face encounter was at 1344. During this time, BP, HR, oximetry, and neurologic status were closely monitored.     Estimated Blood Loss: none     Conclusion:  #1 status post successful acute infarct PCI of the LAD using 2 drug-eluting stents  #2 left anterior ejection fraction 60%        Final Comment:  Pt tolerated the procedure well. There were no obvious complications. Hemostasis was achieved with a 6F perclose hemostatic device. The patient was transferred to the CCU in stable condition        Pertinent Test Results: Wale Valente   Echo Complete w/ Doppler, Color Flow and Contrast   Order# [id number]   Reading physician: Wale Liz MD Ordering physician: Wale Liz MD Study date: 6/15/20   Patient Information     Patient Name  Wale Valente MRN  [id number] Sex  Male  (Age)  1960 (59 y.o.)   Admission Information     Admission Date/Time Discharge Date/Time Room/Bed   20  1303  437/1   Sedation Narrator Report     Sedation Narrator Report   Interpretation Summary     · The following left ventricular wall segments are hypokinetic: apex hypokinetic. The following left ventricular wall segments are akinetic: apical septal.  · Estimated EF = 55%.  · Left ventricular systolic function is normal.  · Left atrial cavity size is borderline dilated.      Patient Hx Of Height, Weight, and Vitals     Height Weight BSA (Calculated - sq m) BMI (kg/m2) Pulse BP   182.9 cm (72\") 89.5 kg (197 lb 5 oz) 2.12 sq " meters 26.82 64 107/59   Reason for Exam     Acute MI   Cardiac History     No past medical history on file.   Study Description     2D Echo with Colorflow, Doppler and Contrast. The study is technically difficult for diagnosis.Verbal consent was obtained from the patient to use Definity contrast in order to optimize the study.   No adverse reaction to contrast was noted.   Echocardiogram Findings     Left Ventricle Left ventricular systolic function is normal. Estimated EF = 55%. Normal left ventricular cavity size and wall thickness noted.   Right Ventricle Normal right ventricular cavity size and systolic function noted.   Left Atrium Left atrial cavity size is borderline dilated.   Right Atrium Normal right atrial size noted.   Aortic Valve The aortic valve is grossly normal in structure. No aortic valve regurgitation is present. No aortic valve stenosis is present.   Mitral Valve The mitral valve is grossly normal in structure. Trace mitral valve regurgitation is present. No significant mitral valve stenosis is present.   Tricuspid Valve The tricuspid valve is grossly normal. No evidence of tricuspid valve stenosis is present. Trace tricuspid valve regurgitation is present.   Pulmonic Valve The pulmonic valve is grossly normal in structure. There is no significant pulmonic valve stenosis present. There is no pulmonic valve regurgitation present.   Greater Vessels No dilation of the aortic root is present. No dilation of the sinuses of Valsalva is present.   Pericardium There is no evidence of pericardial effusion.      Wall Scoring     Score Index: 1.18              The following segments are akinetic: apical septal.  The following segments are hypokinetic: apex.  All other segments are normal.               LV Measurements     Dimensions   LVIDd 4.1 cm      IVSd 0.94 cm      FS 41.4 %      ESV(cubed) 14 ml      EDV(cubed) 69.4 ml      LVOT area 3.8 cm^2      LVOT diam 2.2 cm      Diastolic Filling   MV E max  davis 75.2 cm/sec      MV A max davis 58.2 cm/sec      MV dec time 0.18 sec      MV E/A 1.3       LA Volume Index 30.7 mL/m2      Med Peak E' Davis 9.3 cm/sec      Lat Peak E' Davis 10.6 cm/sec      Avg E/e' ratio 7.56       Shunt Ratio   SV(LVOT) 83.2 ml       Dimensions   LVIDs 2.4 cm      LVPWd 1 cm      IVS/LVPW 0.93       LV Sys Vol (BSA corrected) 30.3 ml/m^2      LV Pedraza Vol (BSA corrected) 71.8 ml/m^2      LV mass(C)d 129.3 grams      EDV(MOD-sp4) 153 ml      ESV(MOD-sp4) 64.6 ml      Systolic Function   SV(MOD-sp4) 88.4 ml      SI(MOD-sp4) 41.5 ml/m^2      EF(MOD-sp4) 57.8 %         LA Measurements     LA Dimensions   LA dimension 3.8 cm      LA volume 65.4 cm3      LA Volume Index 30.7 mL/m2         Aortic Valve Measurements     Stenosis   LVOT diam 2.2 cm      LV V1 max 94.5 cm/sec      LV V1 max PG 3.7 mmHg      LV V1 mean PG 3 mmHg      LV V1 VTI 21.9 cm      Ao pk davis 118 cm/sec       Stenosis   Ao max PG 5.6 mmHg      Ao mean PG 2 mmHg      Ao V2 VTI 21 cm      RAZIA(I,D) 4 cm^2         Mitral Valve Measurements     Stenosis   MV dec time 0.18 sec       Regurgitation   MR max davis 281 cm/sec      MR max PG 36 mmHg      MR mean davis 301 cm/sec      MR mean PG 40 mmHg      MR  cm         Tricuspid Valve Measurements     Regurgitation   TR max davis 239 cm/sec      PISA   TR max davis 239 cm/sec          Greater Vessels Measurements     Ao root diam 3.5 cm      Ao root area (BSA corrected) 1.6              Condition on Discharge:  Stable    Physical Exam at Discharge  Patient Vitals for the past 24 hrs:   BP Temp Temp src Pulse Resp SpO2 Height Weight   06/16/20 0715 108/63 98.7 °F (37.1 °C) Oral 73 16 96 % -- --   06/16/20 0314 111/73 98.5 °F (36.9 °C) Oral 70 16 95 % -- --   06/16/20 0014 115/68 98.1 °F (36.7 °C) Oral 72 16 98 % -- --   06/15/20 2028 126/56 98.3 °F (36.8 °C) Oral 71 16 96 % -- --   06/15/20 1533 118/65 98 °F (36.7 °C) Oral 70 16 98 % -- --   06/15/20 1009 107/59 98.3 °F (36.8 °C) Oral 64 16 94  "% 182.9 cm (72\") 89.5 kg (197 lb 5 oz)   06/15/20 0900 105/62 -- -- 67 -- 98 % -- --     Telemetry: NSR 64-95    Physical Exam   Constitutional: He is oriented to person, place, and time. Vital signs are normal. He appears well-developed and well-nourished. No distress.   HENT:   Head: Normocephalic and atraumatic.   Right Ear: External ear normal.   Left Ear: External ear normal.   Eyes: Pupils are equal, round, and reactive to light. Conjunctivae are normal. Right eye exhibits no discharge. Left eye exhibits no discharge.   Neck: Normal range of motion. Neck supple. No JVD present. Carotid bruit is not present. No thyromegaly present.   Cardiovascular: Normal rate, regular rhythm, normal heart sounds and intact distal pulses. PMI is not displaced. Exam reveals no gallop, no friction rub and no decreased pulses.   No murmur heard.  Pulses:       Radial pulses are 2+ on the right side, and 2+ on the left side.        Dorsalis pedis pulses are 2+ on the right side, and 2+ on the left side.        Posterior tibial pulses are 2+ on the right side, and 2+ on the left side.   Pulmonary/Chest: Effort normal and breath sounds normal. No respiratory distress. He has no decreased breath sounds. He has no wheezes. He has no rhonchi. He has no rales. He exhibits no tenderness.   Abdominal: Soft. Bowel sounds are normal. He exhibits no distension. There is no tenderness.   Musculoskeletal: Normal range of motion. He exhibits no edema.   Neurological: He is alert and oriented to person, place, and time.   Skin: Skin is warm and dry. No rash noted. He is not diaphoretic. No erythema. No pallor.        Psychiatric: He has a normal mood and affect. His behavior is normal. Judgment and thought content normal.   Vitals reviewed.      Discharge Disposition: Home      Discharge Medications     Discharge Medications      New Medications      Instructions Start Date   aspirin 81 MG EC tablet   81 mg, Oral, Daily   Start Date:  June 17, " 2020     atorvastatin 40 MG tablet  Commonly known as:  LIPITOR   40 mg, Oral, Nightly      metoprolol tartrate 25 MG tablet  Commonly known as:  LOPRESSOR   25 mg, Oral, Every 12 Hours Scheduled      ticagrelor 90 MG tablet tablet  Commonly known as:  BRILINTA   90 mg, Oral, 2 Times Daily             Education: 1) No driving for 24 hours and no longer taking narcotics.   2) Return to school / work in 2 weeks.  3) May shower today. No tub bath or standing water for one week.   4) Do not lift / push / pull more then 10 lbs for one week.  5) Report any worsening symptoms.  6) Monitor cath site for signs of bleeding or infection: drainage, swelling, pain, redness, warmth or fever.     Discharge Diet:   Diet Instructions     Diet: Regular, Cardiac; Thin      Discharge Diet:   Regular  Cardiac       Fluid Consistency:  Thin          Activity at Discharge:   Activity Instructions     Other Activity Instructions      1) No driving for 24 hours and no longer taking narcotics.   2) Return to school / work in 2 weeks.  3) May shower today. No tub bath or standing water for one week.   4) Do not lift / push / pull more then 10 lbs for one week.  5) Report any worsening symptoms.  6) Monitor cath site for signs of bleeding or infection: drainage, swelling, pain, redness, warmth or fever.          Follow-up Appointments  No future appointments.         SENG Zaldivar  06/16/20  08:58    Time: 30 minutes                       Electronically signed by Wale Liz MD at 06/16/20 0966

## 2020-06-16 NOTE — DISCHARGE INSTRUCTIONS
1) No driving for 24 hours and no longer taking narcotics.   2) Return to school / work in 2 weeks.  3) May shower today. No tub bath or standing water for one week.   4) Do not lift / push / pull more then 10 lbs for one week.  5) Report any worsening symptoms.  6) Monitor cath site for signs of bleeding or infection: drainage, swelling, pain, redness, warmth or fever.

## 2020-06-16 NOTE — PLAN OF CARE
Problem: Patient Care Overview  Goal: Plan of Care Review  Outcome: Ongoing (interventions implemented as appropriate)  Flowsheets  Taken 6/16/2020 0518  Progress: improving  Outcome Summary: Pt r groin remains CDI, soft, nontender, PPP. No complaints of pain. Rested with eyes closed between care. NSR 64-86 on tele. VSS. Safety maintained, will continue to monitor.  Taken 6/15/2020 2020  Plan of Care Reviewed With: patient

## 2020-06-17 ENCOUNTER — READMISSION MANAGEMENT (OUTPATIENT)
Dept: CALL CENTER | Facility: HOSPITAL | Age: 60
End: 2020-06-17

## 2020-06-17 NOTE — OUTREACH NOTE
Prep Survey      Responses   [facility] facility patient discharged from?  [place]   Is LACE score < 7 ?  No   Eligibility  Readm Mgmt   Discharge diagnosis  ST elevation myocardial infarction involving left anterior descending (LAD) coronary artery    Does the patient have one of the following disease processes/diagnoses(primary or secondary)?  Acute MI (STEMI,NSTEMI)          Zaida Tomlinson RN

## 2020-06-17 NOTE — OUTREACH NOTE
Prep Survey      Responses   [facility] facility patient discharged from?  [place]   Is LACE score < 7 ?  No   Eligibility  Readm Mgmt   Does the patient have one of the following disease processes/diagnoses(primary or secondary)?  Cardiothoracic surgery   Does the patient have Home health ordered?  No   Is there a DME ordered?  No   Medication alerts for this patient  ASA, Brilinta    General alerts for this patient  Heart Cath    Prep survey completed?  Yes          Zaida Tomlinson RN

## 2020-06-17 NOTE — TELEPHONE ENCOUNTER
[facility] Transitions Initial Follow Up Call    Outreach made within 2 business days of discharge: Yes    Patient: Ascencion Hobbs Patient : 1960   MRN: [id number]  Reason for Admission: STEMI  Discharge Date:         Spoke with: pt    Discharge department/facility: [facility] Patient Contact:  Was patient able to fill all prescriptions: Yes  Was patient instructed to bring all medications to the follow-up visit: Yes  Is patient taking all medications as directed in the discharge summary?  Yes  Does patient understand their discharge instructions: Yes  Does patient have questions or concerns that need addressed prior to 7-14 day follow up office visit: no    Scheduled appointment with PCP within 7-14 days    Follow Up  Future Appointments   Date Time Provider Jv Carrion   2020  9:00 [place], [address] [place]

## 2020-06-22 ENCOUNTER — READMISSION MANAGEMENT (OUTPATIENT)
Dept: CALL CENTER | Facility: HOSPITAL | Age: 60
End: 2020-06-22

## 2020-06-22 NOTE — OUTREACH NOTE
CT Surgery Week 1 Survey      Responses   [facility] patient discharged from?  [place]   Does the patient have one of the following disease processes/diagnoses(primary or secondary)?  Cardiothoracic surgery   Is there a successful TCM telephone encounter documented?  No   Week 1 attempt successful?  Yes   Call start time  1109   Call end time  1112   Is patient permission given to speak with other caregiver?  Yes   List who call center can speak with  vicky Vergara reviewed with patient/caregiver?  Yes   Is the patient having any side effects they believe may be caused by any medication additions or changes?  No   Does the patient have all medications related to this admission filled (includes all antibiotics, pain medications, cardiac medications, etc.)  Yes   Is the patient taking all medications as directed (includes completed medication regime)?  Yes   Does the patient have a primary care provider?   Yes   Does the patient have an appointment scheduled with their C/T surgeon?  Yes   Comments regarding PCP  6/23 PCP   Has the patient kept scheduled appointments due by today?  N/A   Has home health visited the patient within 72 hours of discharge?  N/A   Has all DME been delivered?  No   Comments  incisions healing   Did the patient receive a copy of their discharge instructions?  Yes   Nursing interventions  Reviewed instructions with patient   What is the patient's perception of their health status since discharge?  Improving   Nursing interventions  Nurse provided patient education   Is the patient/caregiver able to teach back normal signs of recovery?  Nausea and lack of appetite, Depression or irritability, Constipation, Pain or discomfort at incisional site   Nursing interventions  Reassured on normal signs of recovery   Is the patient /caregiver able to teach back basic post-op care?  Continue use of incentive spirometry at least 1 week post discharge, Practice 'cough and deep breath', Drive as  instructed by MD in discharge instructions, Take showers only when approved by MD-sponge bathe until then, No tub bath, swimming, or hot tub until instructed by MD, Keep incision areas clean, dry and protected, Do not remove steri-strips, Lifting as instructed by MD in discharge instructions   Is the patient/caregiver able to teach back signs and symptoms of incisional infection?  Increased redness, swelling or pain at the incisonal site, Increased drainage or bleeding, Incisional warmth, Pus or odor from incision, Fever   Is the patient/caregiver able to teach back steps to recovery at home?  Set small, achievable goals for return to baseline health, Rest and rebuild strength, gradually increase activity, Weigh daily, Practice good oral hygiene, Eat a well-balance diet, Make a list of questions for surgeon's appointment   Is the patient /caregiver able to teach back the importance of cardiac rehab?  Yes   Nursing interventions  Provided education on importance of cardiac rehab   Week 1 call completed?  Yes            Betsey Johnson RN

## 2020-06-23 ENCOUNTER — HOSPITAL ENCOUNTER (OUTPATIENT)
Dept: GENERAL RADIOLOGY | Age: 60
Discharge: HOME | End: 2020-06-23
Payer: COMMERCIAL

## 2020-06-23 ENCOUNTER — OFFICE VISIT (OUTPATIENT)
Dept: PRIMARY CARE CLINIC | Age: 60
End: 2020-06-23
Payer: COMMERCIAL

## 2020-06-23 VITALS
BODY MASS INDEX: 27.22 KG/M2 | WEIGHT: 201 LBS | SYSTOLIC BLOOD PRESSURE: 120 MMHG | HEIGHT: 72 IN | TEMPERATURE: 97.5 F | HEART RATE: 78 BPM | OXYGEN SATURATION: 98 % | DIASTOLIC BLOOD PRESSURE: 72 MMHG

## 2020-06-23 PROCEDURE — 71046 X-RAY EXAM CHEST 2 VIEWS: CPT

## 2020-06-23 PROCEDURE — 1111F DSCHRG MED/CURRENT MED MERGE: CPT | Performed by: PEDIATRICS

## 2020-06-23 PROCEDURE — 99495 TRANSJ CARE MGMT MOD F2F 14D: CPT | Performed by: PEDIATRICS

## 2020-06-23 RX ORDER — TICAGRELOR 90 MG/1
2 TABLET ORAL DAILY
COMMUNITY
Start: 2020-06-16

## 2020-06-23 RX ORDER — ASPIRIN 81 MG/1
1 TABLET ORAL DAILY
COMMUNITY
Start: 2020-06-16

## 2020-06-23 ASSESSMENT — ENCOUNTER SYMPTOMS
CHEST TIGHTNESS: 0
CONSTIPATION: 0
TROUBLE SWALLOWING: 0
ROS SKIN COMMENTS: NO NEW OR SUSPICIOUS SKIN LESIONS
WHEEZING: 0
COUGH: 0
ALLERGIC/IMMUNOLOGIC NEGATIVE: 1
SHORTNESS OF BREATH: 0
VOMITING: 0
BACK PAIN: 0
RHINORRHEA: 0
NAUSEA: 0
DIARRHEA: 0
ABDOMINAL PAIN: 0
SINUS PRESSURE: 0
SORE THROAT: 0

## 2020-06-23 ASSESSMENT — PATIENT HEALTH QUESTIONNAIRE - PHQ9
SUM OF ALL RESPONSES TO PHQ QUESTIONS 1-9: 0
SUM OF ALL RESPONSES TO PHQ QUESTIONS 1-9: 0
2. FEELING DOWN, DEPRESSED OR HOPELESS: 0
SUM OF ALL RESPONSES TO PHQ9 QUESTIONS 1 & 2: 0
1. LITTLE INTEREST OR PLEASURE IN DOING THINGS: 0

## 2020-06-23 NOTE — PROGRESS NOTES
tightness, shortness of breath and wheezing. No orthopnea   Cardiovascular: Negative for chest pain (or pressure), palpitations and leg swelling. Gastrointestinal: Negative for abdominal pain, constipation, diarrhea, nausea and vomiting. No melena or hematochezia   Endocrine: Negative for cold intolerance, heat intolerance, polydipsia and polyuria. Genitourinary: Negative for difficulty urinating, dysuria and enuresis. Musculoskeletal: Negative for arthralgias, back pain, joint swelling and myalgias. Skin: Negative for rash. No new or suspicious skin lesions   Allergic/Immunologic: Negative. Neurological: Negative for dizziness, tremors, syncope (or presyncope), weakness, light-headedness and headaches. Hematological: Negative for adenopathy. Does not bruise/bleed easily. Psychiatric/Behavioral: Negative. Physical Exam  Vitals signs reviewed. Constitutional:       General: He is not in acute distress. Appearance: He is well-developed and normal weight. HENT:      Head: Normocephalic and atraumatic. Right Ear: Tympanic membrane, ear canal and external ear normal.      Left Ear: Tympanic membrane, ear canal and external ear normal.      Nose: Nose normal.      Mouth/Throat:      Mouth: Mucous membranes are moist.   Eyes:      General: No scleral icterus. Extraocular Movements: Extraocular movements intact. Conjunctiva/sclera: Conjunctivae normal.      Pupils: Pupils are equal, round, and reactive to light. Neck:      Musculoskeletal: Normal range of motion and neck supple. Thyroid: No thyromegaly. Vascular: No carotid bruit or JVD. Cardiovascular:      Rate and Rhythm: Normal rate and regular rhythm. No extrasystoles are present. Chest Wall: PMI is not displaced. Heart sounds: Normal heart sounds, S1 normal and S2 normal. No murmur. No friction rub. No gallop.     Pulmonary:      Effort: Pulmonary effort is normal. No respiratory distress. Breath sounds: Normal breath sounds. No wheezing, rhonchi or rales. Abdominal:      General: Bowel sounds are normal.      Palpations: Abdomen is soft. Tenderness: There is no abdominal tenderness. There is no guarding or rebound. Genitourinary:     Comments: Exam deferred  Musculoskeletal: Normal range of motion. General: No tenderness. Lymphadenopathy:      Cervical: No cervical adenopathy. Skin:     General: Skin is warm and dry. Findings: No rash. Neurological:      Mental Status: He is alert and oriented to person, place, and time. Sensory: No sensory deficit (no numbness or tingling). Psychiatric:         Mood and Affect: Mood normal.         Behavior: Behavior normal.         Thought Content: Thought content normal.         Judgment: Judgment normal.         Assessment/Plan:  1. Hospital discharge follow-up  He is now in line. - [place] DISCHARGE MEDS RECONCILED W/ CURRENT OUTPATIENT MED LIST    2. Coronary artery disease involving native coronary artery of native heart with angina pectoris ([address]  On appropriate medications  - [place] DISCHARGE MEDS RECONCILED W/ CURRENT OUTPATIENT MED LIST    3. S/P arterial stent  He is now on appropriate therapy  - [place] DISCHARGE MEDS RECONCILED W/ CURRENT OUTPATIENT MED LIST        Medical Decision Making: moderate complexity    This was an in-house visit.

## 2020-06-25 ENCOUNTER — TELEPHONE (OUTPATIENT)
Dept: PRIMARY CARE CLINIC | Age: 60
End: 2020-06-25

## 2020-06-26 ENCOUNTER — TELEPHONE (OUTPATIENT)
Dept: CARDIOLOGY | Facility: CLINIC | Age: 60
End: 2020-06-26

## 2020-06-26 NOTE — TELEPHONE ENCOUNTER
Zaria with [facility] called and needs to speak with you about some upcoming dental treatments they have scheduled for this patient.  She can be reached at [phone].  Thank you!

## 2020-06-26 NOTE — TELEPHONE ENCOUNTER
They are needing to know if pt can have dental treatments since he recently had a stent put in.  He is not scheduled for extractions just a crown and some fillings.  He has appt with NP on 6/30.  Please advise.  Elgin Chavez, CMA

## 2020-06-30 ENCOUNTER — READMISSION MANAGEMENT (OUTPATIENT)
Dept: CALL CENTER | Facility: HOSPITAL | Age: 60
End: 2020-06-30

## 2020-06-30 ENCOUNTER — OFFICE VISIT (OUTPATIENT)
Dept: CARDIOLOGY | Facility: CLINIC | Age: 60
End: 2020-06-30

## 2020-06-30 VITALS
HEIGHT: 72 IN | OXYGEN SATURATION: 98 % | DIASTOLIC BLOOD PRESSURE: 64 MMHG | BODY MASS INDEX: 26.95 KG/M2 | SYSTOLIC BLOOD PRESSURE: 118 MMHG | HEART RATE: 66 BPM | WEIGHT: 199 LBS

## 2020-06-30 DIAGNOSIS — I25.2 HISTORY OF ST ELEVATION MYOCARDIAL INFARCTION (STEMI): ICD-10-CM

## 2020-06-30 DIAGNOSIS — Z95.5 PRESENCE OF DRUG COATED STENT IN LAD CORONARY ARTERY: ICD-10-CM

## 2020-06-30 DIAGNOSIS — I25.10 CORONARY ARTERY DISEASE INVOLVING NATIVE CORONARY ARTERY OF NATIVE HEART WITHOUT ANGINA PECTORIS: Primary | ICD-10-CM

## 2020-06-30 DIAGNOSIS — E78.5 HYPERLIPIDEMIA LDL GOAL <70: ICD-10-CM

## 2020-06-30 PROCEDURE — 93000 ELECTROCARDIOGRAM COMPLETE: CPT | Performed by: NURSE PRACTITIONER

## 2020-06-30 PROCEDURE — 99214 OFFICE O/P EST MOD 30 MIN: CPT | Performed by: NURSE PRACTITIONER

## 2020-06-30 RX ORDER — NITROGLYCERIN 0.4 MG/1
TABLET SUBLINGUAL
Qty: 100 TABLET | Refills: 11 | Status: SHIPPED | OUTPATIENT
Start: 2020-06-30 | End: 2021-12-23

## 2020-06-30 NOTE — OUTREACH NOTE
CT Surgery Week 2 Survey      Responses   [facility] patient discharged from?  [place]   Does the patient have one of the following disease processes/diagnoses(primary or secondary)?  Cardiothoracic surgery   Week 2 attempt successful?  No   Unsuccessful attempts  Attempt 1          Yumiko Bell RN

## 2020-06-30 NOTE — PROGRESS NOTES
Subjective:     Encounter Date:06/30/2020      Patient ID: Wale Valente is a 59 y.o. male with a history of anterior STEMI, coronary artery disease s/p MONAE x2 to LAD, and hyperlipidemia.    Chief Complaint: hospital follow up  Coronary Artery Disease   Presents for follow-up visit. Symptoms include palpitations. Pertinent negatives include no chest pain, chest pressure, chest tightness, dizziness, leg swelling, shortness of breath or weight gain. Risk factors include hyperlipidemia. The symptoms have been stable.   Hyperlipidemia   This is a new problem. This is a new diagnosis. The problem is uncontrolled. Recent lipid tests were reviewed and are high. Pertinent negatives include no chest pain or shortness of breath.     Patient presents today for a hospital follow up. He presented to the ER on 6/14 with complaints of sudden onset chest pain. He was taken emergently to the cath lab where angiography noted a total occlusion in the mid-LAD. He received 2 Xience MONAE resulting in no residual stenosis. 2D echo notes EF 55%. He was discharged home on ASA, Brilinta, Lopressor and Lipitor. He states he has been well. He notes occasional palpitations that he attributes to anxiety and wanting to return to work. He is participating in cardiac rehab and does so without pain. He notes compliance with his medication with the exception of one missed dose of Brilinta. He denies shortness of breath, chest pain, orthopnea and PND.     The following portions of the patient's history were reviewed and updated as appropriate: allergies, current medications, past family history, past medical history, past social history, past surgical history and problem list.    No Known Allergies    Current Outpatient Medications:   •  aspirin 81 MG EC tablet, Take 1 tablet by mouth Daily., Disp: 90 tablet, Rfl: 3  •  atorvastatin (LIPITOR) 40 MG tablet, Take 1 tablet by mouth Every Night., Disp: 90 tablet, Rfl: 3  •  metoprolol tartrate  (LOPRESSOR) 25 MG tablet, Take 1 tablet by mouth Every 12 (Twelve) Hours., Disp: 180 tablet, Rfl: 3  •  ticagrelor (BRILINTA) 90 MG tablet tablet, Take 1 tablet by mouth 2 (Two) Times a Day., Disp: 180 tablet, Rfl: 3  •  nitroglycerin (NITROSTAT) 0.4 MG SL tablet, 1 under the tongue as needed for angina, may repeat q5mins for up three doses, Disp: 100 tablet, Rfl: 11  No current facility-administered medications for this visit.     Facility-Administered Medications Ordered in Other Visits:   •  heparin infusion 2 units/mL in 0.9% NaCl, , , PRN, Wale Liz MD, 1,000 mL at 06/14/20 1342  •  iopamidol (ISOVUE-370) 76 % injection, , , PRN, Wale Liz MD, 162 mL at 06/14/20 1345  •  lidocaine (cardiac) (XYLOCAINE) injection, , , PRN, Wale Liz MD, 10 mL at 06/14/20 1323  History reviewed. No pertinent past medical history.    Social History     Socioeconomic History   • Marital status:      Spouse name: Not on file   • Number of children: Not on file   • Years of education: Not on file   • Highest education level: Not on file   Tobacco Use   • Smoking status: Never Smoker   • Smokeless tobacco: Never Used   Substance and Sexual Activity   • Alcohol use: Yes     Alcohol/week: 2.0 standard drinks     Types: 2 Cans of beer per week     Comment: one to two cans a week   • Drug use: Never   • Sexual activity: Defer       Review of Systems   Constitution: Negative for malaise/fatigue, weight gain and weight loss.   Cardiovascular: Positive for palpitations. Negative for chest pain, dyspnea on exertion, irregular heartbeat, leg swelling, near-syncope, orthopnea, paroxysmal nocturnal dyspnea and syncope.   Respiratory: Negative for chest tightness, cough, shortness of breath, sleep disturbances due to breathing, sputum production and wheezing.    Skin: Negative for dry skin, flushing, itching and rash.   Gastrointestinal: Negative for hematemesis and hematochezia.   Neurological: Negative for dizziness,  "light-headedness, loss of balance and weakness.   All other systems reviewed and are negative.        ECG 12 Lead  Date/Time: 6/30/2020 11:44 AM  Performed by: Jennie Ibarra APRN  Authorized by: Jennie Ibarra APRN   Comparison: compared with previous ECG from 6/15/2020  Similar to previous ECG  Rhythm: sinus rhythm  Rate: normal  BPM: 61  T inversion: V1-V6  QRS axis: left  Other findings: non-specific ST-T wave changes    Clinical impression: abnormal EKG          /64   Pulse 66   Ht 182.9 cm (72\")   Wt 90.3 kg (199 lb)   SpO2 98%   BMI 26.99 kg/m²        Objective:     Physical Exam   Constitutional: He is oriented to person, place, and time. He appears well-developed and well-nourished. No distress.   HENT:   Head: Normocephalic.   Mouth/Throat: No oropharyngeal exudate.   Eyes: Pupils are equal, round, and reactive to light. Conjunctivae and EOM are normal. No scleral icterus.   Neck: Normal range of motion. Neck supple.   Cardiovascular: Normal rate, regular rhythm, normal heart sounds and intact distal pulses.   No murmur heard.  Pulmonary/Chest: Effort normal and breath sounds normal. No respiratory distress. He has no wheezes. He has no rales. He exhibits no tenderness.   Abdominal: Soft. Bowel sounds are normal. He exhibits no distension. There is no tenderness.   Musculoskeletal: Normal range of motion. He exhibits no edema.   Neurological: He is alert and oriented to person, place, and time. He has normal reflexes.   Skin: Skin is warm and dry. No rash noted. He is not diaphoretic. No erythema. No pallor.   Psychiatric: He has a normal mood and affect. His behavior is normal.   Vitals reviewed.      Lab Review:   I have reviewed previous hospital records, labs, cardiac testing results and phone calls.     Conclusion:  #1 status post successful acute infarct PCI of the LAD using 2 drug-eluting stents  #2 left anterior ejection fraction 60%    Interpretation Summary     · The following left " ventricular wall segments are hypokinetic: apex hypokinetic. The following left ventricular wall segments are akinetic: apical septal.  · Estimated EF = 55%.  · Left ventricular systolic function is normal.  · Left atrial cavity size is borderline dilated.     Lab Results   Component Value Date    CHOL 149 06/15/2020    CHLPL 223 (H) 10/17/2019    TRIG 80 06/15/2020    HDL 45 06/15/2020    LDL 88 06/15/2020     Lab Results   Component Value Date    HGBA1C 5.30 06/15/2020           Assessment:          Diagnosis Plan   1. Coronary artery disease involving native coronary artery of native heart without angina pectoris     2. Presence of drug coated stent in LAD coronary artery     3. Hyperlipidemia LDL goal <70     4. History of ST elevation myocardial infarction (STEMI)            Plan:       1. CAD- stable. No clinical signs of ischemia. On ASA, brilinta, statin, and BB. Sent SL Nitro to pharmacy   2. MONAE to LAD- on 6/14. On ASA and Brilinta.   3. HLD- elevated at 88. On statin. Will need repeat in 3 months.   4. HX STEMI    Follow up with Dr. Liz in 3 months or sooner if symptoms worsen.     Current outpatient and discharge medications have been reconciled for the patient.  Reviewed by: SENG Solis

## 2020-06-30 NOTE — PATIENT INSTRUCTIONS
Coronary Artery Disease, Male  Coronary artery disease (CAD) is a condition in which the arteries that lead to the heart (coronary arteries) become narrow or blocked. The narrowing or blockage can lead to decreased blood flow to the heart. Prolonged reduced blood flow can cause a heart attack (myocardial infarction or MI). This condition may also be called coronary heart disease.  Because CAD is the leading cause of death in men, it is important to understand what causes this condition and how it is treated.  What are the causes?  CAD is most often caused by atherosclerosis. This is the buildup of fat and cholesterol (plaque) on the inside of the arteries. Over time, the plaque may narrow or block the artery, reducing blood flow to the heart. Plaque can also become weak and break off within a coronary artery and cause a sudden blockage. Other less common causes of CAD include:  · A blood clot or a piece of a blood clot or other substance that blocks the flow of blood in a coronary artery (embolism).  · A tearing of the artery (spontaneous coronary artery dissection).  · An enlargement of an artery (aneurysm).  · Inflammation (vasculitis) in the artery wall.  What increases the risk?  The following factors may make you more likely to develop this condition:  · Age. Men over age 45 are at a greater risk of CAD.  · Family history of CAD.  · Gender. Men often develop CAD earlier in life than women.  · High blood pressure (hypertension).  · Diabetes.  · High cholesterol levels.  · Tobacco use.  · Excessive alcohol use.  · Lack of exercise.  · A diet high in saturated and trans fats, such as fried food and processed meat.  Other possible risk factors include:  · High stress levels.  · Depression.  · Obesity.  · Sleep apnea.  What are the signs or symptoms?  Many people do not have any symptoms during the early stages of CAD. As the condition progresses, symptoms may include:  · Chest pain (angina). The pain can:  ? Feel  like crushing or squeezing, or like a tightness, pressure, fullness, or heaviness in the chest.  ? Last more than a few minutes or can stop and recur. The pain tends to get worse with exercise or stress and to fade with rest.  · Pain in the arms, neck, jaw, ear, or back.  · Unexplained heartburn or indigestion.  · Shortness of breath.  · Nausea or vomiting.  · Sudden light-headedness.  · Sudden cold sweats.  · Fluttering or fast heartbeat (palpitations).  How is this diagnosed?  This condition is diagnosed based on:  · Your family and medical history.  · A physical exam.  · Tests, including:  ? A test to check the electrical signals in your heart (electrocardiogram).  ? Exercise stress test. This looks for signs of blockage when the heart is stressed with exercise, such as running on a treadmill.  ? Pharmacologic stress test. This test looks for signs of blockage when the heart is being stressed with a medicine.  ? Blood tests.  ? Coronary angiogram. This is a procedure to look at the coronary arteries to see if there is any blockage. During this test, a dye is injected into your arteries so they appear on an X-ray.  ? Coronary artery CT scan. This CT scan helps detect calcium deposits in your coronary arteries. Calcium deposits are an indicator of CAD.  ? A test that uses sound waves to take a picture of your heart (echocardiogram).  ? Chest X-ray.  How is this treated?  This condition may be treated by:  · Healthy lifestyle changes to reduce risk factors.  · Medicines such as:  ? Antiplatelet medicines and blood-thinning medicines, such as aspirin. These help to prevent blood clots.  ? Nitroglycerin.  ? Blood pressure medicines.  ? Cholesterol-lowering medicine.  · Coronary angioplasty and stenting. During this procedure, a thin, flexible tube is inserted through a blood vessel and into a blocked artery. A balloon or similar device on the end of the tube is inflated to open up the artery. In some cases, a small,  mesh tube (stent) is inserted into the artery to keep it open.  · Coronary artery bypass surgery. During this surgery, veins or arteries from other parts of the body are used to create a bypass around the blockage and allow blood to reach your heart.  Follow these instructions at home:  Medicines  · Take over-the-counter and prescription medicines only as told by your health care provider.  · Do not take the following medicines unless your health care provider approves:  ? NSAIDs, such as ibuprofen, naproxen, or celecoxib.  ? Vitamin supplements that contain vitamin A, vitamin E, or both.  Lifestyle  · Follow an exercise program approved by your health care provider. Aim for 150 minutes of moderate exercise or 75 minutes of vigorous exercise each week.  · Maintain a healthy weight or lose weight as approved by your health care provider.  · Learn to manage stress or try to limit your stress. Ask your health care provider for suggestions if you need help.  · Get screened for depression and seek treatment, if needed.  · Do not use any products that contain nicotine or tobacco, such as cigarettes, e-cigarettes, and chewing tobacco. If you need help quitting, ask your health care provider.  · Do not use illegal drugs.  Eating and drinking    · Follow a heart-healthy diet. A dietitian can help educate you about healthy food options and changes. In general, eat plenty of fruits and vegetables, lean meats, and whole grains.  · Avoid foods high in:  ? Sugar.  ? Salt (sodium).  ? Saturated fat, such as processed or fatty meat.  ? Trans fat, such as fried foods.  · Use healthy cooking methods such as roasting, grilling, broiling, baking, poaching, steaming, or stir-frying.  · Do not drink alcohol if your health care provider tells you not to drink.  · If you drink alcohol:  ? Limit how much you have to 0-2 drinks per day.  ? Be aware of how much alcohol is in your drink. In the [place] one drink equals one 12 oz bottle of beer  (355 mL), one 5 oz glass of wine (148 mL), or one 1½ oz glass of hard liquor (44 mL).  General instructions  · Manage any other health conditions, such as hypertension and diabetes. These conditions affect your heart.  · Your health care provider may ask you to monitor your blood pressure. Ideally, your blood pressure should be below 130/80.  · Keep all follow-up visits as told by your health care provider. This is important.  Get help right away if:  · You have pain in your chest, neck, ear, arm, jaw, stomach, or back that:  ? Lasts more than a few minutes.  ? Is recurring.  ? Is not relieved by taking medicine under your tongue (sublingual nitroglycerin).  · You have profuse sweating without cause.  · You have unexplained:  ? Heartburn or indigestion.  ? Shortness of breath or difficulty breathing.  ? Fluttering or fast heartbeat (palpitations).  ? Nausea or vomiting.  ? Fatigue.  ? Feelings of nervousness or anxiety.  ? Weakness.  ? Diarrhea.  · You have sudden light-headedness or dizziness.  · You faint.  · You feel like hurting yourself or think about taking your own life.  These symptoms may represent a serious problem that is an emergency. Do not wait to see if the symptoms will go away. Get medical help right away. Call your local emergency services (911 in the [place] Do not drive yourself to the hospital.  Summary  · Coronary artery disease (CAD) is a condition in which the arteries that lead to the heart (coronary arteries) become narrow or blocked. The narrowing or blockage can lead to a heart attack.  · Many people do not have any symptoms during the early stages of CAD.  · CAD can be treated with lifestyle changes, medicines, surgery, or a combination of these treatments.  This information is not intended to replace advice given to you by your health care provider. Make sure you discuss any questions you have with your health care provider.  Document Released: 07/15/2015 Document Revised: 09/06/2019  Document Reviewed: 08/27/2019  Elsevier Patient Education © 2020 [employer]

## 2020-07-01 ENCOUNTER — READMISSION MANAGEMENT (OUTPATIENT)
Dept: CALL CENTER | Facility: HOSPITAL | Age: 60
End: 2020-07-01

## 2020-07-01 NOTE — OUTREACH NOTE
CT Surgery Week 2 Survey      Responses   [facility] patient discharged from?  [place]   Does the patient have one of the following disease processes/diagnoses(primary or secondary)?  Cardiothoracic surgery   Week 2 attempt successful?  Yes   Call start time  1722   Call end time  1726   General alerts for this patient  Heart Cath    Discharge diagnosis  ST elevation myocardial infarction involving left anterior descending (LAD) coronary artery    Meds reviewed with patient/caregiver?  Yes   Is the patient having any side effects they believe may be caused by any medication additions or changes?  No   Does the patient have all medications related to this admission filled (includes all antibiotics, pain medications, cardiac medications, etc.)  Yes   Is the patient taking all medications as directed (includes completed medication regime)?  Yes   Does the patient have a primary care provider?   Yes   Does the patient have an appointment scheduled with their C/T surgeon?  Yes   Has the patient kept scheduled appointments due by today?  Yes   Has home health visited the patient within 72 hours of discharge?  N/A   Has all DME been delivered?  No   Psychosocial issues?  No   Did the patient receive a copy of their discharge instructions?  Yes   Nursing interventions  Reviewed instructions with patient   What is the patient's perception of their health status since discharge?  Improving   Nursing interventions  Nurse provided patient education   Is the patient/caregiver able to teach back normal signs of recovery?  Nausea and lack of appetite, Depression or irritability, Constipation, Pain or discomfort at incisional site   Nursing interventions  Reassured on normal signs of recovery   Is the patient /caregiver able to teach back basic post-op care?  Continue use of incentive spirometry at least 1 week post discharge, Practice 'cough and deep breath', Drive as instructed by MD in discharge instructions, Take showers  only when approved by MD-sponge bathe until then, No tub bath, swimming, or hot tub until instructed by MD, Keep incision areas clean, dry and protected, Do not remove steri-strips, Lifting as instructed by MD in discharge instructions   Is the patient/caregiver able to teach back signs and symptoms of incisional infection?  Increased redness, swelling or pain at the incisonal site, Increased drainage or bleeding, Incisional warmth, Pus or odor from incision, Fever   Is the patient/caregiver able to teach back steps to recovery at home?  Set small, achievable goals for return to baseline health, Rest and rebuild strength, gradually increase activity, Weigh daily, Practice good oral hygiene, Eat a well-balance diet, Make a list of questions for surgeon's appointment   Is the patient /caregiver able to teach back the importance of cardiac rehab?  Yes   Nursing interventions  Provided education on importance of cardiac rehab   Is the patient/caregiver able to teach back the hierarchy of who to call/visit for symptoms/problems? PCP, Specialist, Home health nurse, Urgent Care, ED, 911  Yes   Week 2 call completed?  Yes          Zane Mills RN

## 2020-07-09 ENCOUNTER — READMISSION MANAGEMENT (OUTPATIENT)
Dept: CALL CENTER | Facility: HOSPITAL | Age: 60
End: 2020-07-09

## 2020-07-09 NOTE — OUTREACH NOTE
CT Surgery Week 3 Survey      Responses   [facility] patient discharged from?  [place]   Does the patient have one of the following disease processes/diagnoses(primary or secondary)?  Cardiothoracic surgery   Week 3 attempt successful?  No   Unsuccessful attempts  Attempt 1   General alerts for this patient  .          Naima Lopes RN

## 2020-09-23 ENCOUNTER — OFFICE VISIT (OUTPATIENT)
Dept: PRIMARY CARE CLINIC | Age: 60
End: 2020-09-23
Payer: COMMERCIAL

## 2020-09-23 VITALS
TEMPERATURE: 97.3 F | BODY MASS INDEX: 28.44 KG/M2 | SYSTOLIC BLOOD PRESSURE: 120 MMHG | DIASTOLIC BLOOD PRESSURE: 82 MMHG | WEIGHT: 210 LBS | HEIGHT: 72 IN | OXYGEN SATURATION: 98 % | HEART RATE: 85 BPM

## 2020-09-23 PROCEDURE — 99214 OFFICE O/P EST MOD 30 MIN: CPT | Performed by: PEDIATRICS

## 2020-09-23 RX ORDER — ATORVASTATIN CALCIUM 20 MG/1
20 TABLET, FILM COATED ORAL NIGHTLY
Qty: 30 TABLET | Refills: 11 | Status: SHIPPED | OUTPATIENT
Start: 2020-09-23

## 2020-09-23 ASSESSMENT — ENCOUNTER SYMPTOMS
SORE THROAT: 0
ALLERGIC/IMMUNOLOGIC NEGATIVE: 1
CHEST TIGHTNESS: 0
BACK PAIN: 0
ROS SKIN COMMENTS: NO NEW OR SUSPICIOUS SKIN LESIONS
SINUS PRESSURE: 0
TROUBLE SWALLOWING: 0
NAUSEA: 0
SHORTNESS OF BREATH: 0
COUGH: 0
WHEEZING: 0
DIARRHEA: 0
RHINORRHEA: 0
ABDOMINAL PAIN: 0
CONSTIPATION: 0
VOMITING: 0

## 2020-09-23 NOTE — PROGRESS NOTES
[facility], [address]  Phone [phone]   Fax [phone]      OFFICE VISIT: 9/23/2020    Jesse Cummings: 1960      [facility]  Reason For Visit:  Neymar Cochran is a 61 y.o.     3 Month Follow-Up      Patient presents on 3-month follow-up for multiple health issues. He had a ST elevated myocardial infarction back in June of this year. Since that time:he is doing well. Prior to this event he was not on any medications at all. Coronary Artery Disease:  Medications:   Beta-blockade: Metoprolol tartrate 25 mg bid   RAAS Therapy: None   Lipid Management: Atorvastatin 20 mg nightly   Platelet Inhibition: Brilinta 90 mg bid   Anti-anginal:  None  Symptoms: None  Nitroglycerin use: None  Cardiology follow-up: Not since his event  Additional studies: No additional studies since his event      Hyperlipidemia:   He has not had his lipids checked since starting atorvastatin  Medication:   atorvastatin (Lipitor)  Low Fat, Low Choleterol Diet:  yes -to some degree  Myalgias or GI upset: no  The patient exercises intermittently. Laboratory:    Lab Results   Component Value Date    CHOL 223 (H) 10/17/2019    TRIG 115 10/17/2019    HDL 52 (L) 10/17/2019    LDLCALC 148 10/17/2019      Lab Results   Component Value Date    ALT 27 10/17/2019    AST 18 10/17/2019                height is 6' (1.829 m) and weight is 210 lb (95.3 kg). His temporal temperature is 97.3 °F (36.3 °C). His blood pressure is 120/82 and his pulse is 85. His oxygen saturation is 98%. Body mass index is 28.48 kg/m². I have reviewed the following with the Mr. Ascencion Ortiz   Lab Review  No visits with results within 6 Month(s) from this visit.    Latest known visit with results is:   Orders Only on 10/17/2019   Component Date Value    PSA 10/17/2019 0.01     TSH 10/17/2019 0.605     Cholesterol, Total 10/17/2019 223*    Triglycerides 10/17/2019 115     HDL 10/17/2019 52*    LDL Calculated 10/17/2019 148  Sodium 10/17/2019 142     Potassium 10/17/2019 4.2     Chloride 10/17/2019 103     CO2 10/17/2019 23     Anion Gap 10/17/2019 16     Glucose 10/17/2019 95     BUN 10/17/2019 12     CREATININE 10/17/2019 0.8     GFR Non- 10/17/2019 >60     Calcium 10/17/2019 9.3     Total Protein 10/17/2019 7.8     Alb 10/17/2019 4.3     Total Bilirubin 10/17/2019 0.7     Alkaline Phosphatase 10/17/2019 79     ALT 10/17/2019 27     AST 10/17/2019 18     WBC 10/17/2019 7.5     RBC 10/17/2019 5.55     Hemoglobin 10/17/2019 15.8     Hematocrit 10/17/2019 50.8     MCV 10/17/2019 91.5     MCH 10/17/2019 28.5     MCHC 10/17/2019 31.1*    RDW 10/17/2019 13.2     Platelets 14/25/5507 268     MPV 10/17/2019 11.5     Neutrophils % 10/17/2019 29.8*    Lymphocytes % 10/17/2019 59.1*    Monocytes % 10/17/2019 8.4     Eosinophils % 10/17/2019 1.5     Basophils % 10/17/2019 0.8     Neutrophils Absolute 10/17/2019 2.2     Immature Granulocytes # 10/17/2019 0.0     Lymphocytes Absolute 10/17/2019 4.4     Monocytes Absolute 10/17/2019 0.60     Eosinophils Absolute 10/17/2019 0.10     Basophils Absolute 10/17/2019 0.10      Copies of these are in the chart. Current Outpatient Medications   Medication Sig Dispense Refill    atorvastatin (LIPITOR) 20 MG tablet Take 1 tablet by mouth nightly 30 tablet 11    BRILINTA 90 MG TABS tablet Take 2 tablets by mouth daily       ASPIRIN ADULT LOW STRENGTH 81 MG EC tablet Take 1 tablet by mouth daily      metoprolol tartrate (LOPRESSOR) 25 MG tablet Take 1 tablet by mouth daily       No current facility-administered medications for this visit. Allergies: Patient has no known allergies. Past Medical History:   Diagnosis Date    Prostate cancer ([facility] [address]        No family history on file.     Past Surgical History:   Procedure Laterality Date    APPENDECTOMY      CAROTID STENT PLACEMENT      PROSTATE SURGERY         Social History     Tobacco Use  Smoking status: Never Smoker    Smokeless tobacco: Never Used   Substance Use Topics    Alcohol use: Yes     Frequency: Monthly or less        Review of Systems   Constitutional: Negative for appetite change, chills, diaphoresis, fatigue, fever and unexpected weight change. HENT: Negative for congestion, dental problem (following with dentist regularly), ear pain, hearing loss, postnasal drip, rhinorrhea, sinus pressure, sore throat, tinnitus and trouble swallowing. Eyes: Negative for visual disturbance (following with regular eye exams). Respiratory: Negative for cough, chest tightness, shortness of breath and wheezing. No orthopnea   Cardiovascular: Negative for chest pain (or pressure), palpitations and leg swelling. Gastrointestinal: Negative for abdominal pain, constipation, diarrhea, nausea and vomiting. No melena or hematochezia   Endocrine: Negative for cold intolerance, heat intolerance, polydipsia and polyuria. Genitourinary: Negative for difficulty urinating, dysuria and enuresis. Musculoskeletal: Negative for arthralgias, back pain, joint swelling and myalgias. Skin: Negative for rash. No new or suspicious skin lesions   Allergic/Immunologic: Negative. Neurological: Negative for dizziness, tremors, syncope (or presyncope), weakness, light-headedness and headaches. Hematological: Negative for adenopathy. Does not bruise/bleed easily. Psychiatric/Behavioral: Negative. Physical Exam  Vitals signs reviewed. Constitutional:       General: He is not in acute distress. Appearance: He is well-developed and normal weight. HENT:      Head: Normocephalic and atraumatic. Right Ear: Tympanic membrane, ear canal and external ear normal.      Left Ear: Tympanic membrane, ear canal and external ear normal.      Nose: Nose normal.      Mouth/Throat:      Mouth: Mucous membranes are moist.   Eyes:      General: No scleral icterus.      Extraocular Movements: Extraocular movements intact. Conjunctiva/sclera: Conjunctivae normal.      Pupils: Pupils are equal, round, and reactive to light. Neck:      Musculoskeletal: Normal range of motion and neck supple. Thyroid: No thyromegaly. Vascular: No carotid bruit or JVD. Cardiovascular:      Rate and Rhythm: Normal rate and regular rhythm. No extrasystoles are present. Chest Wall: PMI is not displaced. Heart sounds: Normal heart sounds, S1 normal and S2 normal. No murmur. No friction rub. No gallop. Pulmonary:      Effort: Pulmonary effort is normal. No respiratory distress. Breath sounds: Normal breath sounds. No wheezing, rhonchi or rales. Abdominal:      General: Bowel sounds are normal.      Palpations: Abdomen is soft. Tenderness: There is no abdominal tenderness. There is no guarding or rebound. Genitourinary:     Comments: Exam deferred  Musculoskeletal: Normal range of motion. General: No tenderness. Lymphadenopathy:      Cervical: No cervical adenopathy. Skin:     General: Skin is warm and dry. Findings: No rash. Neurological:      Mental Status: He is alert and oriented to person, place, and time. Sensory: No sensory deficit (no numbness or tingling). Psychiatric:         Mood and Affect: Mood normal.         Behavior: Behavior normal.         Thought Content: Thought content normal.         Judgment: Judgment normal.             ASSESSMENT      ICD-10-CM    1. Coronary artery disease involving native coronary artery of native heart without angina pectoris  I25.10 CBC Auto Differential     Comprehensive Metabolic Panel     Microalbumin / Creatinine Urine Ratio   2. Mixed hyperlipidemia  E78.2 Comprehensive Metabolic Panel     Lipid Panel   3. [facility]  C61 Psa screening   4. Fatigue, unspecified type  R53.83 TSH without Reflex   5. Elevated LDL cholesterol level  E78.00 atorvastatin (LIPITOR) 20 MG tablet   6.  Screen for colon cancer  Z12.11 Murphy Vanessa MD, Gastroenterology, [facility]    1. Coronary artery disease involving native coronary artery of native heart without angina pectoris  Doing well on present regimen  - CBC Auto Differential; Future  - Comprehensive Metabolic Panel; Future  - Microalbumin / Creatinine Urine Ratio; Future    2. Mixed hyperlipidemia  Will need to re-check  - Comprehensive Metabolic Panel; Future  - Lipid Panel; Future    3. Prostate cancer ([facility] [address]  Will recheck psa  - Psa screening; Future    4. Fatigue, unspecified type  Check labs  - TSH without Reflex; Future    5. Elevated LDL cholesterol level  Recheck lipids  - atorvastatin (LIPITOR) 20 MG tablet; Take 1 tablet by mouth nightly  Dispense: 30 tablet; Refill: 11    6. Screen for colon cancer  Set up colon cancer screening.  - Murphy Vanessa MD, Gastroenterology, [place]      Orders Placed This Encounter   Procedures    CBC Auto Differential    Comprehensive Metabolic Panel    Lipid Panel    Microalbumin / Creatinine Urine Ratio    TSH without Reflex    Psa screening   Murphy Vanessa MD, GastroenterologyMaria L        Return in about 6 months (around 3/23/2021) for 30. This was an in-house visit.

## 2020-10-01 ENCOUNTER — OFFICE VISIT (OUTPATIENT)
Dept: GASTROENTEROLOGY | Age: 60
End: 2020-10-01

## 2020-10-01 VITALS
OXYGEN SATURATION: 99 % | BODY MASS INDEX: 27.71 KG/M2 | DIASTOLIC BLOOD PRESSURE: 72 MMHG | SYSTOLIC BLOOD PRESSURE: 138 MMHG | WEIGHT: 204.6 LBS | HEIGHT: 72 IN | HEART RATE: 76 BPM

## 2020-10-01 PROCEDURE — 99999 PR OFFICE/OUTPT VISIT,PROCEDURE ONLY: CPT | Performed by: NURSE PRACTITIONER

## 2020-10-01 ASSESSMENT — ENCOUNTER SYMPTOMS
CHOKING: 0
BLOOD IN STOOL: 0
DIARRHEA: 0
VOMITING: 0
CONSTIPATION: 0
SHORTNESS OF BREATH: 0
ABDOMINAL DISTENTION: 0
ANAL BLEEDING: 0
COUGH: 0
TROUBLE SWALLOWING: 0
RECTAL PAIN: 0
ABDOMINAL PAIN: 0
NAUSEA: 0

## 2020-10-01 NOTE — PROGRESS NOTES
Subjective:     Patient ID: Maryan Mcclellan is a 61 y.o. male  PCP: Ayala Segura DO  Referring Provider: Amarjit Islas DO    HPI  Patient presents to the office today with the following complaints: New Patient (Discusss scheduling for Colonoscopy )      Pt here to schedule colonoscopy  Patient denies any lower GI symptoms such as constipation, diarrhea, change in bowel habits, rectal bleeding, and rectal pain. Last Colonoscopy 10 year ago - normal per patient  Denies any family history of colon cancer or colon polyps    Hx prostate cancer  Hx CAD with stents - 3-4 months ago. Follows Dr. Sangeetha Mooney   Daily use of anticoagulant - Brilinta     This was my first time assessing Mr. Menendez    Assessment:     1. Colon cancer screening    2. Current use of anticoagulant therapy    3. Coronary artery disease involving native coronary artery of native heart without angina pectoris    4. S/P primary angioplasty with coronary stent            Plan:   - Plan for screening colonoscopy. However, we will need to find out from Cardiologist when patient will be cleared for sedation and ok to hold Brilinta due to recent MI with stents - Dr. Sangeetha Mooney  - Anticipate this will be in a year. ..pt instructed to call with any questions or concerns. Signs and symptoms of CRC reviewed with patient and he verbalized understanding.  - Follow up prn or sooner if needed      Orders  No orders of the defined types were placed in this encounter. Medications  No orders of the defined types were placed in this encounter.         Patient History:     Past Medical History:   Diagnosis Date    Prostate cancer [facility])        Past Surgical History:   Procedure Laterality Date    APPENDECTOMY      CAROTID STENT PLACEMENT      COLONOSCOPY      \"in [place], around age 48, normal \" ago per patient     PROSTATE SURGERY         Family History   Problem Relation Age of Onset    Liver Cancer Father     Colon Cancer Neg Hx     Colon Polyps Neg Hx     Rectal Cancer Neg Hx     Esophageal Cancer Neg Hx        Social History     Socioeconomic History    Marital status:      Spouse name: None    Number of children: None    Years of education: None    Highest education level: None   Occupational History    None   Social Needs    Financial resource strain: None    Food insecurity     Worry: None     Inability: None    Transportation needs     Medical: None     Non-medical: None   Tobacco Use    Smoking status: Never Smoker    Smokeless tobacco: Never Used   Substance and Sexual Activity    Alcohol use: Yes     Frequency: Monthly or less     Comment: in the summer time [facility] Drug use: Never    Sexual activity: None   Lifestyle    Physical activity     Days per week: None     Minutes per session: None    Stress: None   Relationships    Social connections     Talks on phone: None     Gets together: None     Attends Nondenominational service: None     Active member of club or organization: None     Attends meetings of clubs or organizations: None     Relationship status: None    Intimate partner violence     Fear of current or ex partner: None     Emotionally abused: None     Physically abused: None     Forced sexual activity: None   Other Topics Concern    None   Social History Narrative    None       Current Outpatient Medications   Medication Sig Dispense Refill    atorvastatin (LIPITOR) 20 MG tablet Take 1 tablet by mouth nightly 30 tablet 11    BRILINTA 90 MG TABS tablet Take 2 tablets by mouth daily       ASPIRIN ADULT LOW STRENGTH 81 MG EC tablet Take 1 tablet by mouth daily      metoprolol tartrate (LOPRESSOR) 25 MG tablet Take 1 tablet by mouth daily       No current facility-administered medications for this visit. No Known Allergies    Review of Systems   Constitutional: Negative for activity change, appetite change, fatigue, fever and unexpected weight change. HENT: Negative for trouble swallowing.

## 2020-12-18 ENCOUNTER — OFFICE VISIT (OUTPATIENT)
Dept: CARDIOLOGY | Facility: CLINIC | Age: 60
End: 2020-12-18

## 2020-12-18 VITALS
HEIGHT: 72 IN | BODY MASS INDEX: 28.44 KG/M2 | WEIGHT: 210 LBS | SYSTOLIC BLOOD PRESSURE: 140 MMHG | HEART RATE: 70 BPM | DIASTOLIC BLOOD PRESSURE: 70 MMHG | OXYGEN SATURATION: 99 %

## 2020-12-18 DIAGNOSIS — E78.5 HYPERLIPIDEMIA LDL GOAL <70: ICD-10-CM

## 2020-12-18 DIAGNOSIS — R00.2 PALPITATIONS: ICD-10-CM

## 2020-12-18 DIAGNOSIS — I25.10 CORONARY ARTERY DISEASE INVOLVING NATIVE CORONARY ARTERY OF NATIVE HEART WITHOUT ANGINA PECTORIS: Primary | ICD-10-CM

## 2020-12-18 PROCEDURE — 93000 ELECTROCARDIOGRAM COMPLETE: CPT | Performed by: INTERNAL MEDICINE

## 2020-12-18 PROCEDURE — 99214 OFFICE O/P EST MOD 30 MIN: CPT | Performed by: INTERNAL MEDICINE

## 2020-12-18 RX ORDER — MELOXICAM 15 MG/1
TABLET ORAL AS NEEDED
COMMUNITY
Start: 2020-12-17

## 2020-12-18 NOTE — PROGRESS NOTES
Referring Provider: Freddy Germain DO    Reason for Follow-up Visit: CAD    Subjective .   Chief Complaint:   Chief Complaint   Patient presents with   • Follow-up     6 month fu s/p stent in June   • Coronary Artery Disease     pt states he is doing well with no complaints of symptoms.  he did admit that he is not compliant with the Brilinta by missing his night dose at times.  wants to know if this is bad.?   • Hyperlipidemia     last labs done 6/15/20 LDL was 88 started on Lipitor 40 mg       History of present illness:  Wale Valente is a 60 y.o. yo male with history of STEMI back in June, s/p MONAE to the LAD. He denies any chest pain or SOB.        History  Past Medical History:   Diagnosis Date   • Cancer (CMS/HCC)     prostate cancer   • Coronary artery disease    • Hyperlipidemia    • Myocardial infarction (CMS/HCC)    ,   Past Surgical History:   Procedure Laterality Date   • CARDIAC CATHETERIZATION N/A 6/14/2020    Procedure: Left Heart Cath;  Surgeon: Wale Liz MD;  Location:  [facility] CATH INVASIVE LOCATION;  Service: Cardiovascular;  Laterality: N/A;   • CORONARY STENT PLACEMENT     • PROSTATECTOMY     ,   Family History   Problem Relation Age of Onset   • Cancer Mother    • Cancer Father    • Prostate cancer Brother    • No Known Problems Brother    ,   Social History     Tobacco Use   • Smoking status: Never Smoker   • Smokeless tobacco: Never Used   Substance Use Topics   • Alcohol use: Yes     Alcohol/week: 2.0 standard drinks     Types: 2 Cans of beer per week     Comment: one to two cans a week in the summer   • Drug use: Never   ,     Medications  Current Outpatient Medications   Medication Sig Dispense Refill   • aspirin 81 MG EC tablet Take 1 tablet by mouth Daily. 90 tablet 3   • atorvastatin (LIPITOR) 40 MG tablet Take 1 tablet by mouth Every Night. 90 tablet 3   • meloxicam (MOBIC) 15 MG tablet As Needed.     • metoprolol tartrate (LOPRESSOR) 25 MG tablet Take 1 tablet by mouth Every  "12 (Twelve) Hours. 180 tablet 3   • nitroglycerin (NITROSTAT) 0.4 MG SL tablet 1 under the tongue as needed for angina, may repeat q5mins for up three doses 100 tablet 11   • ticagrelor (BRILINTA) 90 MG tablet tablet Take 1 tablet by mouth 2 (Two) Times a Day. 180 tablet 3     No current facility-administered medications for this visit.      Facility-Administered Medications Ordered in Other Visits   Medication Dose Route Frequency Provider Last Rate Last Admin   • heparin infusion 2 units/mL in 0.9% NaCl    PRN Wale Liz MD   1,000 mL at 06/14/20 1342   • iopamidol (ISOVUE-370) 76 % injection    PRN Wale Liz MD   162 mL at 06/14/20 1345   • lidocaine (cardiac) (XYLOCAINE) injection    PRN Wale Liz MD   10 mL at 06/14/20 1323       Allergies:  Patient has no known allergies.    Review of Systems  Review of Systems   HENT: Negative for nosebleeds.    Cardiovascular: Positive for palpitations. Negative for chest pain, claudication, dyspnea on exertion, irregular heartbeat, leg swelling, near-syncope, orthopnea, paroxysmal nocturnal dyspnea and syncope.   Respiratory: Negative for cough, hemoptysis and shortness of breath.    Gastrointestinal: Negative for dysphagia, hematemesis and melena.   Genitourinary: Negative for hematuria.   All other systems reviewed and are negative.      Objective     Physical Exam:  /70   Pulse 70   Ht 182.9 cm (72\")   Wt 95.3 kg (210 lb)   SpO2 99%   BMI 28.48 kg/m²   Constitutional:       General: Not in acute distress.     Appearance: Not diaphoretic.   Eyes:      General: No scleral icterus.  HENT:      Head: Normocephalic and atraumatic.   Neck:      Musculoskeletal: Normal range of motion and neck supple.      Vascular: No carotid bruit.   Pulmonary:      Effort: Pulmonary effort is normal. No respiratory distress.      Breath sounds: Normal breath sounds. No wheezing. No rales.   Cardiovascular:      PMI at left midclavicular line. Normal rate. Regular " rhythm.      No gallop.   Edema:     Peripheral edema absent.   Abdominal:      General: Bowel sounds are normal. There is no distension.      Palpations: Abdomen is soft.      Tenderness: There is no abdominal tenderness.   Skin:     General: Skin is warm and dry.      Findings: No erythema.   Neurological:      Mental Status: Alert and oriented to person, place, and time.   Psychiatric:         Behavior: Behavior normal.         Results Review:    ECG 12 Lead    Date/Time: 12/18/2020 8:29 AM  Performed by: Wale Liz MD  Authorized by: Wale Liz MD   Comparison: compared with previous ECG   Rhythm: sinus rhythm  Rate: normal  Conduction: conduction normal  ST Segments: ST segments normal  T Waves: T waves normal  QRS axis: normal  Other findings: poor R wave progression    Clinical impression: non-specific ECG            Admission on 06/14/2020, Discharged on 06/16/2020   Component Date Value Ref Range Status   • Troponin T 06/14/2020 2.480* 0.000 - 0.030 ng/mL Final   • Troponin T 06/14/2020 6.980* 0.000 - 0.030 ng/mL Final   • BSA 06/15/2020 2.1  m^2 Final   • IVSd 06/15/2020 0.94  cm Final   • LVIDd 06/15/2020 4.1  cm Final   • LVIDs 06/15/2020 2.4  cm Final   • LVPWd 06/15/2020 1.0  cm Final   • IVS/LVPW 06/15/2020 0.93   Final   • FS 06/15/2020 41.4  % Final   • EDV(Teich) 06/15/2020 74.7  ml Final   • ESV(Teich) 06/15/2020 20.4  ml Final   • EF(Teich) 06/15/2020 72.7  % Final   • EDV(cubed) 06/15/2020 69.4  ml Final   • ESV(cubed) 06/15/2020 14.0  ml Final   • EF(cubed) 06/15/2020 79.8  % Final   • LV mass(C)d 06/15/2020 129.3  grams Final   • LV mass(C)dI 06/15/2020 60.7  grams/m^2 Final   • SV(Teich) 06/15/2020 54.3  ml Final   • SI(Teich) 06/15/2020 25.5  ml/m^2 Final   • SV(cubed) 06/15/2020 55.4  ml Final   • SI(cubed) 06/15/2020 26.0  ml/m^2 Final   • Ao root diam 06/15/2020 3.5  cm Final   • Ao root area 06/15/2020 9.6  cm^2 Final   • LA dimension 06/15/2020 3.8  cm Final   • LA/Ao 06/15/2020  1.1   Final   • LVOT diam 06/15/2020 2.2  cm Final   • LVOT area 06/15/2020 3.8  cm^2 Final   • LVOT area(traced) 06/15/2020 3.8  cm^2 Final   • LVLd ap4 06/15/2020 8.9  cm Final   • EDV(MOD-sp4) 06/15/2020 153.0  ml Final   • LVLs ap4 06/15/2020 7.7  cm Final   • ESV(MOD-sp4) 06/15/2020 64.6  ml Final   • EF(MOD-sp4) 06/15/2020 57.8  % Final   • SV(MOD-sp4) 06/15/2020 88.4  ml Final   • SI(MOD-sp4) 06/15/2020 41.5  ml/m^2 Final   • Ao root area (BSA corrected) 06/15/2020 1.6   Final   • LV Pedraza Vol (BSA corrected) 06/15/2020 71.8  ml/m^2 Final   • LV Sys Vol (BSA corrected) 06/15/2020 30.3  ml/m^2 Final   • MV E max martin 06/15/2020 75.2  cm/sec Final   • MV A max martin 06/15/2020 58.2  cm/sec Final   • MV E/A 06/15/2020 1.3   Final   • MV dec time 06/15/2020 0.18  sec Final   • Ao pk martin 06/15/2020 118.0  cm/sec Final   • Ao max PG 06/15/2020 5.6  mmHg Final   • Ao max PG (full) 06/15/2020 1.9  mmHg Final   • Ao V2 mean 06/15/2020 69.6  cm/sec Final   • Ao mean PG 06/15/2020 2.0  mmHg Final   • Ao mean PG (full) 06/15/2020 -1.0  mmHg Final   • Ao V2 VTI 06/15/2020 21.0  cm Final   • RAZIA(I,A) 06/15/2020 4.0  cm^2 Final   • RAZIA(I,D) 06/15/2020 4.0  cm^2 Final   • RAZIA(V,A) 06/15/2020 3.0  cm^2 Final   • RAZIA(V,D) 06/15/2020 3.0  cm^2 Final   • LV V1 max PG 06/15/2020 3.7  mmHg Final   • LV V1 mean PG 06/15/2020 3.0  mmHg Final   • LV V1 max 06/15/2020 94.5  cm/sec Final   • LV V1 mean 06/15/2020 74.3  cm/sec Final   • LV V1 VTI 06/15/2020 21.9  cm Final   • MR max martin 06/15/2020 281.0  cm/sec Final   • MR max PG 06/15/2020 36.0  mmHg Final   • MR mean martin 06/15/2020 301.0  cm/sec Final   • MR mean PG 06/15/2020 40.0  mmHg Final   • MR VTI 06/15/2020 126.0  cm Final   • SV(Ao) 06/15/2020 201.6  ml Final   • SI(Ao) 06/15/2020 94.6  ml/m^2 Final   • SV(LVOT) 06/15/2020 83.2  ml Final   • SI(LVOT) 06/15/2020 39.1  ml/m^2 Final   • TR max martin 06/15/2020 239.0  cm/sec Final   • BH CV ECHO SILVANO - BZI_BMI 06/15/2020 27.1   kilograms/m^2 Final   • BH CV ECHO SILVANO - BSA([facility]) 06/15/2020 2.2  m^2 Final   • BH CV ECHO SILVANO - BZI_METRIC_WEIGHT 06/15/2020 90.7  kg Final   • BH CV ECHO SILVANO - BZI_METRIC_HEIGHT 06/15/2020 182.9  cm Final   • Target HR (85%) 06/15/2020 137  bpm Final   • Max. Pred. HR (100%) 06/15/2020 161  bpm Final   • LA volume 06/15/2020 65.4  cm3 Final   • LA Volume Index 06/15/2020 30.7  mL/m2 Final   • Avg E/e' ratio 06/15/2020 7.56   Final   • Lat Peak E' Davis 06/15/2020 10.6  cm/sec Final   • Med Peak E' Davis 06/15/2020 9.30  cm/sec Final   • Echo EF Estimated 06/15/2020 55  % Final   • WBC 06/15/2020 9.96  3.40 - 10.80 10*3/mm3 Final   • RBC 06/15/2020 5.00  4.14 - 5.80 10*6/mm3 Final   • Hemoglobin 06/15/2020 13.8  13.0 - 17.7 g/dL Final   • Hematocrit 06/15/2020 42.8  37.5 - 51.0 % Final   • MCV 06/15/2020 85.6  79.0 - 97.0 fL Final   • MCH 06/15/2020 27.6  26.6 - 33.0 pg Final   • MCHC 06/15/2020 32.2  31.5 - 35.7 g/dL Final   • RDW 06/15/2020 13.3  12.3 - 15.4 % Final   • RDW-SD 06/15/2020 41.4  37.0 - 54.0 fl Final   • MPV 06/15/2020 11.0  6.0 - 12.0 fL Final   • Platelets 06/15/2020 250  140 - 450 10*3/mm3 Final   • Glucose 06/15/2020 106* 65 - 99 mg/dL Final   • BUN 06/15/2020 13  6 - 20 mg/dL Final   • Creatinine 06/15/2020 0.77  0.76 - 1.27 mg/dL Final   • Sodium 06/15/2020 140  136 - 145 mmol/L Final   • Potassium 06/15/2020 4.2  3.5 - 5.2 mmol/L Final   • Chloride 06/15/2020 106  98 - 107 mmol/L Final   • CO2 06/15/2020 24.0  22.0 - 29.0 mmol/L Final   • Calcium 06/15/2020 8.9  8.6 - 10.5 mg/dL Final   • eGFR Non African Amer 06/15/2020 103  >60 mL/min/1.73 Final   • BUN/Creatinine Ratio 06/15/2020 16.9  7.0 - 25.0 Final   • Anion Gap 06/15/2020 10.0  5.0 - 15.0 mmol/L Final   • Hemoglobin A1C 06/15/2020 5.30  4.80 - 5.60 % Final   • Total Cholesterol 06/15/2020 149  0 - 200 mg/dL Final   • Triglycerides 06/15/2020 80  0 - 150 mg/dL Final   • HDL Cholesterol 06/15/2020 45  40 - 60 mg/dL Final   • LDL  Cholesterol  06/15/2020 88  0 - 100 mg/dL Final   • VLDL Cholesterol 06/15/2020 16  mg/dL Final   • LDL/HDL Ratio 06/15/2020 1.96   Final   • Glucose 06/16/2020 121  70 - 130 mg/dL Final    : [id number] Lopes ManolodaphneyMeter ID: [id number]       Assessment/Plan   Diagnoses and all orders for this visit:    1. Coronary artery disease involving native coronary artery of native heart without angina pectoris (Primary), currently doing well. Will repeat an echo to assess LV function    2. Hyperlipidemia LDL goal <70, needs to be repeated    3. Palpitations, may need to have a 21 day event monitor if these persist    Other orders  -     ECG 12 Lead

## 2021-01-11 ENCOUNTER — HOSPITAL ENCOUNTER (OUTPATIENT)
Dept: CARDIOLOGY | Facility: HOSPITAL | Age: 61
Discharge: HOME | End: 2021-01-11
Admitting: INTERNAL MEDICINE

## 2021-01-11 DIAGNOSIS — I25.10 CORONARY ARTERY DISEASE INVOLVING NATIVE CORONARY ARTERY OF NATIVE HEART WITHOUT ANGINA PECTORIS: ICD-10-CM

## 2021-01-11 LAB
BH CV ECHO MEAS - AO MAX PG (FULL): 1.1 MMHG
BH CV ECHO MEAS - AO MAX PG: 3.3 MMHG
BH CV ECHO MEAS - AO MEAN PG (FULL): 1 MMHG
BH CV ECHO MEAS - AO MEAN PG: 2 MMHG
BH CV ECHO MEAS - AO ROOT AREA (BSA CORRECTED): 1.5
BH CV ECHO MEAS - AO ROOT AREA: 8.6 CM^2
BH CV ECHO MEAS - AO ROOT DIAM: 3.3 CM
BH CV ECHO MEAS - AO V2 MAX: 91.4 CM/SEC
BH CV ECHO MEAS - AO V2 MEAN: 62.6 CM/SEC
BH CV ECHO MEAS - AO V2 VTI: 19.4 CM
BH CV ECHO MEAS - AVA(I,A): 2.7 CM^2
BH CV ECHO MEAS - AVA(I,D): 2.7 CM^2
BH CV ECHO MEAS - AVA(V,A): 2.9 CM^2
BH CV ECHO MEAS - AVA(V,D): 2.9 CM^2
BH CV ECHO MEAS - BSA(HAYCOCK): 2.2 M^2
BH CV ECHO MEAS - BSA: 2.2 M^2
BH CV ECHO MEAS - BZI_BMI: 28.5 KILOGRAMS/M^2
BH CV ECHO MEAS - BZI_METRIC_HEIGHT: 182.9 CM
BH CV ECHO MEAS - BZI_METRIC_WEIGHT: 95.3 KG
BH CV ECHO MEAS - EDV(CUBED): 101.8 ML
BH CV ECHO MEAS - EDV(MOD-SP4): 127 ML
BH CV ECHO MEAS - EDV(TEICH): 100.8 ML
BH CV ECHO MEAS - EF(CUBED): 76.3 %
BH CV ECHO MEAS - EF(MOD-SP4): 58 %
BH CV ECHO MEAS - EF(TEICH): 68.3 %
BH CV ECHO MEAS - ESV(CUBED): 24.1 ML
BH CV ECHO MEAS - ESV(MOD-SP4): 53.3 ML
BH CV ECHO MEAS - ESV(TEICH): 31.9 ML
BH CV ECHO MEAS - FS: 38.1 %
BH CV ECHO MEAS - IVS/LVPW: 1.1
BH CV ECHO MEAS - IVSD: 0.79 CM
BH CV ECHO MEAS - LA DIMENSION: 4 CM
BH CV ECHO MEAS - LA/AO: 1.2
BH CV ECHO MEAS - LAT PEAK E' VEL: 7.1 CM/SEC
BH CV ECHO MEAS - LV DIASTOLIC VOL/BSA (35-75): 58.4 ML/M^2
BH CV ECHO MEAS - LV MASS(C)D: 110 GRAMS
BH CV ECHO MEAS - LV MASS(C)DI: 50.6 GRAMS/M^2
BH CV ECHO MEAS - LV MAX PG: 2.3 MMHG
BH CV ECHO MEAS - LV MEAN PG: 1 MMHG
BH CV ECHO MEAS - LV SYSTOLIC VOL/BSA (12-30): 24.5 ML/M^2
BH CV ECHO MEAS - LV V1 MAX: 75.4 CM/SEC
BH CV ECHO MEAS - LV V1 MEAN: 49.8 CM/SEC
BH CV ECHO MEAS - LV V1 VTI: 15.1 CM
BH CV ECHO MEAS - LVIDD: 4.7 CM
BH CV ECHO MEAS - LVIDS: 2.9 CM
BH CV ECHO MEAS - LVLD AP4: 8.4 CM
BH CV ECHO MEAS - LVLS AP4: 7 CM
BH CV ECHO MEAS - LVOT AREA (M): 3.5 CM^2
BH CV ECHO MEAS - LVOT AREA: 3.5 CM^2
BH CV ECHO MEAS - LVOT DIAM: 2.1 CM
BH CV ECHO MEAS - LVPWD: 0.69 CM
BH CV ECHO MEAS - MED PEAK E' VEL: 8.59 CM/SEC
BH CV ECHO MEAS - MR MAX PG: 57.3 MMHG
BH CV ECHO MEAS - MR MAX VEL: 378.3 CM/SEC
BH CV ECHO MEAS - MR MEAN PG: 40 MMHG
BH CV ECHO MEAS - MR MEAN VEL: 297 CM/SEC
BH CV ECHO MEAS - MR VTI: 142 CM
BH CV ECHO MEAS - MV A MAX VEL: 60.1 CM/SEC
BH CV ECHO MEAS - MV DEC TIME: 0.22 SEC
BH CV ECHO MEAS - MV E MAX VEL: 85.9 CM/SEC
BH CV ECHO MEAS - MV E/A: 1.4
BH CV ECHO MEAS - SI(AO): 76.3 ML/M^2
BH CV ECHO MEAS - SI(CUBED): 35.7 ML/M^2
BH CV ECHO MEAS - SI(LVOT): 24 ML/M^2
BH CV ECHO MEAS - SI(MOD-SP4): 33.9 ML/M^2
BH CV ECHO MEAS - SI(TEICH): 31.7 ML/M^2
BH CV ECHO MEAS - SV(AO): 165.9 ML
BH CV ECHO MEAS - SV(CUBED): 77.7 ML
BH CV ECHO MEAS - SV(LVOT): 52.3 ML
BH CV ECHO MEAS - SV(MOD-SP4): 73.7 ML
BH CV ECHO MEAS - SV(TEICH): 68.9 ML
BH CV ECHO MEAS - TR MAX VEL: 137 CM/SEC
BH CV ECHO MEASUREMENTS AVERAGE E/E' RATIO: 10.95
LEFT ATRIUM VOLUME INDEX: 41.9 ML/M2
LEFT ATRIUM VOLUME: 91.3 CM3
LV EF 2D ECHO EST: 55 %
MAXIMAL PREDICTED HEART RATE: 160 BPM
STRESS TARGET HR: 136 BPM

## 2021-01-11 PROCEDURE — 93306 TTE W/DOPPLER COMPLETE: CPT

## 2021-01-11 PROCEDURE — 25010000002 PERFLUTREN 6.52 MG/ML SUSPENSION: Performed by: INTERNAL MEDICINE

## 2021-01-11 PROCEDURE — 93306 TTE W/DOPPLER COMPLETE: CPT | Performed by: INTERNAL MEDICINE

## 2021-01-11 RX ADMIN — PERFLUTREN: 6.52 INJECTION, SUSPENSION INTRAVENOUS at 07:55

## 2021-04-27 RX ORDER — ASPIRIN 81 MG/1
TABLET ORAL
Qty: 90 TABLET | Refills: 3 | Status: SHIPPED | OUTPATIENT
Start: 2021-04-27 | End: 2022-07-06

## 2021-04-27 RX ORDER — TICAGRELOR 90 MG/1
TABLET ORAL
Qty: 180 TABLET | Refills: 3 | Status: SHIPPED | OUTPATIENT
Start: 2021-04-27 | End: 2021-12-20

## 2021-04-27 RX ORDER — ATORVASTATIN CALCIUM 40 MG/1
TABLET, FILM COATED ORAL
Qty: 90 TABLET | Refills: 3 | Status: SHIPPED | OUTPATIENT
Start: 2021-04-27 | End: 2022-07-06

## 2021-12-20 ENCOUNTER — OFFICE VISIT (OUTPATIENT)
Dept: CARDIOLOGY | Facility: CLINIC | Age: 61
End: 2021-12-20

## 2021-12-20 VITALS
WEIGHT: 211 LBS | SYSTOLIC BLOOD PRESSURE: 130 MMHG | DIASTOLIC BLOOD PRESSURE: 88 MMHG | HEIGHT: 72 IN | BODY MASS INDEX: 28.58 KG/M2 | HEART RATE: 72 BPM | OXYGEN SATURATION: 99 %

## 2021-12-20 DIAGNOSIS — E78.5 HYPERLIPIDEMIA LDL GOAL <70: ICD-10-CM

## 2021-12-20 DIAGNOSIS — I20.0 UNSTABLE ANGINA (HCC): ICD-10-CM

## 2021-12-20 DIAGNOSIS — I25.10 CORONARY ARTERY DISEASE INVOLVING NATIVE CORONARY ARTERY OF NATIVE HEART WITHOUT ANGINA PECTORIS: Primary | ICD-10-CM

## 2021-12-20 PROCEDURE — 93000 ELECTROCARDIOGRAM COMPLETE: CPT | Performed by: INTERNAL MEDICINE

## 2021-12-20 PROCEDURE — 99214 OFFICE O/P EST MOD 30 MIN: CPT | Performed by: INTERNAL MEDICINE

## 2021-12-20 RX ORDER — PRASUGREL 5 MG/1
5 TABLET, FILM COATED ORAL DAILY
Qty: 90 TABLET | Refills: 3 | Status: SHIPPED | OUTPATIENT
Start: 2021-12-20 | End: 2022-11-14

## 2021-12-20 NOTE — PROGRESS NOTES
Referring Provider: Freddy Germain DO    Reason for Follow-up Visit: CAD    Subjective .   Chief Complaint:   Chief Complaint   Patient presents with   • Follow-up     yearly   • Coronary Artery Disease     pt states he is still having some sob that is more than normal.  he feels like he is having some stress related chest pain.   • Hyperlipidemia     last lab done 6/2020 LDL was 88 on Lipitor 40 mg   • Atrial Flutter     pt states he has been feeling some fluttering with all that is going on.       History of present illness:  Wale Valente is a 61 y.o. yo male with known CAD, s/p MONAE to the LAD 18 mo ago. Complains of vague chest heaviness and JOHNSON.       History  Past Medical History:   Diagnosis Date   • Cancer (HCC)     prostate cancer   • Coronary artery disease    • Hyperlipidemia    • Myocardial infarction (HCC)    ,   Past Surgical History:   Procedure Laterality Date   • CARDIAC CATHETERIZATION N/A 6/14/2020    Procedure: Left Heart Cath;  Surgeon: Wale Liz MD;  Location:  [facility];  Service: Cardiovascular;  Laterality: N/A;   • CORONARY STENT PLACEMENT     • PROSTATECTOMY     ,   Family History   Problem Relation Age of Onset   • Cancer Mother    • Cancer Father    • Prostate cancer Brother    • No Known Problems Brother    ,   Social History     Tobacco Use   • Smoking status: Never Smoker   • Smokeless tobacco: Never Used   Vaping Use   • Vaping Use: Never used   Substance Use Topics   • Alcohol use: Yes     Alcohol/week: 2.0 standard drinks     Types: 2 Cans of beer per week     Comment: one to two cans a week in the summer   • Drug use: Never   ,     Medications  Current Outpatient Medications   Medication Sig Dispense Refill   • aspirin 81 MG EC tablet TAKE 1 TABLET BY MOUTH EVERY DAY 90 tablet 3   • atorvastatin (LIPITOR) 40 MG tablet TAKE 1 TABLET BY MOUTH AT BEDTIME 90 tablet 3   • meloxicam (MOBIC) 15 MG tablet As Needed.     • metoprolol tartrate (LOPRESSOR) 25  "MG tablet TAKE 1 TABLET BY MOUTH EVERY 12 HOURS 180 tablet 3   • nitroglycerin (NITROSTAT) 0.4 MG SL tablet 1 under the tongue as needed for angina, may repeat q5mins for up three doses 100 tablet 11   • prasugrel (Effient) 5 MG tablet Take 1 tablet by mouth Daily. 90 tablet 3     No current facility-administered medications for this visit.     Facility-Administered Medications Ordered in Other Visits   Medication Dose Route Frequency Provider Last Rate Last Admin   • heparin infusion 2 units/mL in 0.9% NaCl    PRN Wale Liz MD   1,000 mL at 06/14/20 1342   • iopamidol (ISOVUE-370) 76 % injection    PRN Wale Liz MD   162 mL at 06/14/20 1345   • lidocaine (cardiac) (XYLOCAINE) injection    PRN Wale Liz MD   10 mL at 06/14/20 1323       Allergies:  Patient has no known allergies.    Review of Systems  Review of Systems   HENT: Negative for nosebleeds.    Cardiovascular: Positive for dyspnea on exertion. Negative for chest pain, claudication, leg swelling, near-syncope, orthopnea, palpitations, paroxysmal nocturnal dyspnea and syncope.   Respiratory: Negative for hemoptysis and shortness of breath.    Gastrointestinal: Negative for melena.   Genitourinary: Negative for hematuria.       Objective     Physical Exam:  /88   Pulse 72   Ht 182.9 cm (72\")   Wt 95.7 kg (211 lb)   SpO2 99%   BMI 28.62 kg/m²   Pulmonary:      Effort: Pulmonary effort is normal.      Breath sounds: Normal breath sounds.   Cardiovascular:      Normal rate. Regular rhythm.      Murmurs: There is no murmur.   Edema:     Peripheral edema absent.         Results Review:    ECG 12 Lead    Date/Time: 12/20/2021 9:34 AM  Performed by: Wale Liz MD  Authorized by: Wale Liz MD   Comparison: compared with previous ECG   Rhythm: sinus rhythm  Rate: normal  Conduction: conduction normal  ST Segments: ST segments normal  T Waves: T waves normal  QRS axis: normal    Clinical impression: normal ECG            [facility]" Outpatient Visit on 01/11/2021   Component Date Value Ref Range Status   • BSA 01/11/2021 2.2  m^2 Final   • IVSd 01/11/2021 0.79  cm Final   • LVIDd 01/11/2021 4.7  cm Final   • LVIDs 01/11/2021 2.9  cm Final   • LVPWd 01/11/2021 0.69  cm Final   • IVS/LVPW 01/11/2021 1.1   Final   • FS 01/11/2021 38.1  % Final   • EDV(Teich) 01/11/2021 100.8  ml Final   • ESV(Teich) 01/11/2021 31.9  ml Final   • EF(Teich) 01/11/2021 68.3  % Final   • EDV(cubed) 01/11/2021 101.8  ml Final   • ESV(cubed) 01/11/2021 24.1  ml Final   • EF(cubed) 01/11/2021 76.3  % Final   • LV mass(C)d 01/11/2021 110.0  grams Final   • LV mass(C)dI 01/11/2021 50.6  grams/m^2 Final   • SV(Teich) 01/11/2021 68.9  ml Final   • SI(Teich) 01/11/2021 31.7  ml/m^2 Final   • SV(cubed) 01/11/2021 77.7  ml Final   • SI(cubed) 01/11/2021 35.7  ml/m^2 Final   • Ao root diam 01/11/2021 3.3  cm Final   • Ao root area 01/11/2021 8.6  cm^2 Final   • LA dimension 01/11/2021 4.0  cm Final   • LA/Ao 01/11/2021 1.2   Final   • LVOT diam 01/11/2021 2.1  cm Final   • LVOT area 01/11/2021 3.5  cm^2 Final   • LVOT area(traced) 01/11/2021 3.5  cm^2 Final   • LVLd ap4 01/11/2021 8.4  cm Final   • EDV(MOD-sp4) 01/11/2021 127.0  ml Final   • LVLs ap4 01/11/2021 7.0  cm Final   • ESV(MOD-sp4) 01/11/2021 53.3  ml Final   • EF(MOD-sp4) 01/11/2021 58.0  % Final   • SV(MOD-sp4) 01/11/2021 73.7  ml Final   • SI(MOD-sp4) 01/11/2021 33.9  ml/m^2 Final   • Ao root area (BSA corrected) 01/11/2021 1.5   Final   • LV Pedraza Vol (BSA corrected) 01/11/2021 58.4  ml/m^2 Final   • LV Sys Vol (BSA corrected) 01/11/2021 24.5  ml/m^2 Final   • MV E max martin 01/11/2021 85.9  cm/sec Final   • MV A max martin 01/11/2021 60.1  cm/sec Final   • MV E/A 01/11/2021 1.4   Final   • MV dec time 01/11/2021 0.22  sec Final   • Ao pk martin 01/11/2021 91.4  cm/sec Final   • Ao max PG 01/11/2021 3.3  mmHg Final   • Ao max PG (full) 01/11/2021 1.1  mmHg Final   • Ao V2 mean 01/11/2021 62.6  cm/sec Final   • Ao mean PG  01/11/2021 2.0  mmHg Final   • Ao mean PG (full) 01/11/2021 1.0  mmHg Final   • Ao V2 VTI 01/11/2021 19.4  cm Final   • RAZIA(I,A) 01/11/2021 2.7  cm^2 Final   • RAZIA(I,D) 01/11/2021 2.7  cm^2 Final   • RAZIA(V,A) 01/11/2021 2.9  cm^2 Final   • RAZIA(V,D) 01/11/2021 2.9  cm^2 Final   • LV V1 max PG 01/11/2021 2.3  mmHg Final   • LV V1 mean PG 01/11/2021 1.0  mmHg Final   • LV V1 max 01/11/2021 75.4  cm/sec Final   • LV V1 mean 01/11/2021 49.8  cm/sec Final   • LV V1 VTI 01/11/2021 15.1  cm Final   • MR max davis 01/11/2021 378.3  cm/sec Final   • MR max PG 01/11/2021 57.3  mmHg Final   • MR mean davis 01/11/2021 297.0  cm/sec Final   • MR mean PG 01/11/2021 40.0  mmHg Final   • MR VTI 01/11/2021 142.0  cm Final   • SV(Ao) 01/11/2021 165.9  ml Final   • SI(Ao) 01/11/2021 76.3  ml/m^2 Final   • SV(LVOT) 01/11/2021 52.3  ml Final   • SI(LVOT) 01/11/2021 24.0  ml/m^2 Final   • TR max davis 01/11/2021 137.0  cm/sec Final   • BH CV ECHO SILVANO - BZI_BMI 01/11/2021 28.5  kilograms/m^2 Final   • BH CV ECHO SILVANO - BSA(HAYCOCK) 01/11/2021 2.2  m^2 Final   • BH CV ECHO SILVANO - BZI_METRIC_WEIGHT 01/11/2021 95.3  kg Final   • BH CV ECHO SILVANO - BZI_METRIC_HEIGHT 01/11/2021 182.9  cm Final   • Target HR (85%) 01/11/2021 136  bpm Final   • Max. Pred. HR (100%) 01/11/2021 160  bpm Final   • LA volume 01/11/2021 91.3  cm3 Final   • LA Volume Index 01/11/2021 41.9  mL/m2 Final   • Avg E/e' ratio 01/11/2021 10.95   Final   • Lat Peak E' Davis 01/11/2021 7.1  cm/sec Final   • Med Peak E' Davis 01/11/2021 8.59  cm/sec Final   • Echo EF Estimated 01/11/2021 55  % Final       Assessment/Plan   Problem List Items Addressed This Visit        Cardiac and Vasculature    Coronary artery disease involving native coronary artery of native heart without angina pectoris - Primary    Current Assessment & Plan     Having some exertional symptoms. Will schedule a stress test. His insurance is not wanting to pay for his brilinta. Will change to effient and stop asa if his  stress test is normal         Relevant Medications    prasugrel (Effient) 5 MG tablet    Other Relevant Orders    ECG 12 Lead    Hyperlipidemia LDL goal <70    Current Assessment & Plan     His LDL is a little high and needs to be checked again           Other Visit Diagnoses     Unstable angina (HCC)        Relevant Medications    prasugrel (Effient) 5 MG tablet    Other Relevant Orders    Stress Test With Myocardial Perfusion (1 Day)          Medical Complexity  must have 2 out of 3     Moderate Complexity Level 4           1 of the following medical problems:          []One chronic illness with mild exacerbation         []Two or more stable chronic illness          [x]One new problem  []One acute illness with systemic symptoms    Complexity of Data  Reviewed (1 out of the 3 following categories)      Category 1 tests, documents, historian (must have 3 points)       []Review of prior external records  [x]Review of results of unique tests  []Ordering unique tests  []Assessment requires an independent historian    Category 2 Interpretation of tests   []Independent interpretation of test read by another doc    Category 3 Discuss Management/tests  []Discussion with external physician    Risk of complications and/or morbidity          [x]Prescription Drug Management

## 2021-12-20 NOTE — ASSESSMENT & PLAN NOTE
Having some exertional symptoms. Will schedule a stress test. His insurance is not wanting to pay for his brilinta. Will change to effient and stop asa if his stress test is normal

## 2021-12-23 RX ORDER — NITROGLYCERIN 0.4 MG/1
TABLET SUBLINGUAL
Qty: 100 TABLET | Refills: 11 | Status: SHIPPED | OUTPATIENT
Start: 2021-12-23

## 2022-01-04 ENCOUNTER — NURSE TRIAGE (OUTPATIENT)
Dept: CALL CENTER | Facility: HOSPITAL | Age: 62
End: 2022-01-04

## 2022-01-04 NOTE — TELEPHONE ENCOUNTER
"    Reason for Disposition  • Health Information question, no triage required and triager able to answer question    Additional Information  • Negative: [1] Caller is not with the adult (patient) AND [2] reporting urgent symptoms  • Negative: Lab result questions  • Negative: Medication questions  • Negative: Caller can't be reached by phone  • Negative: Caller has already spoken to PCP or another triager  • Negative: RN needs further essential information from caller in order to complete triage  • Negative: Requesting regular office appointment  • Negative: [1] Caller requesting NON-URGENT health information AND [2] PCP's office is the best resource    Answer Assessment - Initial Assessment Questions  1. REASON FOR CALL or QUESTION: \"What is your reason for calling today?\" or \"How can I best help you?\" or \"What question do you have that I can help answer?\"      Clarified that orders were in the system for CMP    Protocols used: INFORMATION ONLY CALL-ADULT-      "

## 2022-01-05 ENCOUNTER — TELEPHONE (OUTPATIENT)
Dept: CARDIOLOGY | Facility: CLINIC | Age: 62
End: 2022-01-05

## 2022-01-05 ENCOUNTER — LAB (OUTPATIENT)
Dept: LAB | Facility: HOSPITAL | Age: 62
End: 2022-01-05

## 2022-01-05 DIAGNOSIS — E78.5 HYPERLIPIDEMIA LDL GOAL <70: Primary | ICD-10-CM

## 2022-01-05 DIAGNOSIS — E78.5 HYPERLIPIDEMIA LDL GOAL <70: ICD-10-CM

## 2022-01-05 LAB
ALBUMIN SERPL-MCNC: 4.5 G/DL (ref 3.5–5.2)
ALBUMIN/GLOB SERPL: 1.3 G/DL
ALP SERPL-CCNC: 87 U/L (ref 39–117)
ALT SERPL W P-5'-P-CCNC: 35 U/L (ref 1–41)
ANION GAP SERPL CALCULATED.3IONS-SCNC: 8 MMOL/L (ref 5–15)
AST SERPL-CCNC: 27 U/L (ref 1–40)
BILIRUB SERPL-MCNC: 1 MG/DL (ref 0–1.2)
BUN SERPL-MCNC: 14 MG/DL (ref 8–23)
BUN/CREAT SERPL: 15.7 (ref 7–25)
CALCIUM SPEC-SCNC: 9.5 MG/DL (ref 8.6–10.5)
CHLORIDE SERPL-SCNC: 103 MMOL/L (ref 98–107)
CHOLEST SERPL-MCNC: 130 MG/DL (ref 0–200)
CO2 SERPL-SCNC: 26 MMOL/L (ref 22–29)
CREAT SERPL-MCNC: 0.89 MG/DL (ref 0.76–1.27)
GFR SERPL CREATININE-BSD FRML MDRD: 87 ML/MIN/1.73
GLOBULIN UR ELPH-MCNC: 3.6 GM/DL
GLUCOSE SERPL-MCNC: 95 MG/DL (ref 65–99)
HDLC SERPL-MCNC: 50 MG/DL (ref 40–60)
LDLC SERPL CALC-MCNC: 68 MG/DL (ref 0–100)
LDLC/HDLC SERPL: 1.37 {RATIO}
POTASSIUM SERPL-SCNC: 4.2 MMOL/L (ref 3.5–5.2)
PROT SERPL-MCNC: 8.1 G/DL (ref 6–8.5)
SODIUM SERPL-SCNC: 137 MMOL/L (ref 136–145)
TRIGL SERPL-MCNC: 57 MG/DL (ref 0–150)
VLDLC SERPL-MCNC: 12 MG/DL (ref 5–40)

## 2022-01-05 PROCEDURE — 36415 COLL VENOUS BLD VENIPUNCTURE: CPT

## 2022-01-05 PROCEDURE — 80061 LIPID PANEL: CPT

## 2022-01-05 PROCEDURE — 80053 COMPREHEN METABOLIC PANEL: CPT

## 2022-01-06 ENCOUNTER — HOSPITAL ENCOUNTER (OUTPATIENT)
Dept: CARDIOLOGY | Facility: HOSPITAL | Age: 62
Discharge: HOME | End: 2022-01-06

## 2022-01-06 VITALS
HEART RATE: 65 BPM | SYSTOLIC BLOOD PRESSURE: 138 MMHG | DIASTOLIC BLOOD PRESSURE: 75 MMHG | WEIGHT: 210 LBS | BODY MASS INDEX: 28.44 KG/M2 | HEIGHT: 72 IN

## 2022-01-06 DIAGNOSIS — I20.0 UNSTABLE ANGINA: ICD-10-CM

## 2022-01-06 PROCEDURE — 0 TECHNETIUM SESTAMIBI: Performed by: INTERNAL MEDICINE

## 2022-01-06 PROCEDURE — 78452 HT MUSCLE IMAGE SPECT MULT: CPT | Performed by: INTERNAL MEDICINE

## 2022-01-06 PROCEDURE — A9500 TC99M SESTAMIBI: HCPCS | Performed by: INTERNAL MEDICINE

## 2022-01-06 PROCEDURE — 78452 HT MUSCLE IMAGE SPECT MULT: CPT

## 2022-01-06 PROCEDURE — 93018 CV STRESS TEST I&R ONLY: CPT | Performed by: INTERNAL MEDICINE

## 2022-01-06 PROCEDURE — 93017 CV STRESS TEST TRACING ONLY: CPT

## 2022-01-06 RX ADMIN — TECHNETIUM TC 99M SESTAMIBI 1 DOSE: 1 INJECTION INTRAVENOUS at 09:49

## 2022-01-06 RX ADMIN — TECHNETIUM TC 99M SESTAMIBI 1 DOSE: 1 INJECTION INTRAVENOUS at 11:44

## 2022-01-07 ENCOUNTER — TELEPHONE (OUTPATIENT)
Dept: CARDIOLOGY | Facility: CLINIC | Age: 62
End: 2022-01-07

## 2022-01-07 ENCOUNTER — TELEPHONE (OUTPATIENT)
Dept: PRIMARY CARE CLINIC | Age: 62
End: 2022-01-07

## 2022-01-07 DIAGNOSIS — Z12.11 ENCOUNTER FOR SCREENING COLONOSCOPY: Primary | ICD-10-CM

## 2022-01-07 LAB
BH CV REST NUCLEAR ISOTOPE DOSE: 10.9 MCI
BH CV STRESS BP STAGE 1: NORMAL
BH CV STRESS BP STAGE 2: NORMAL
BH CV STRESS BP STAGE 3: NORMAL
BH CV STRESS DURATION MIN STAGE 1: 3
BH CV STRESS DURATION MIN STAGE 2: 3
BH CV STRESS DURATION MIN STAGE 3: 3
BH CV STRESS DURATION SEC STAGE 1: 0
BH CV STRESS DURATION SEC STAGE 2: 0
BH CV STRESS DURATION SEC STAGE 3: 0
BH CV STRESS GRADE STAGE 1: 10
BH CV STRESS GRADE STAGE 2: 12
BH CV STRESS GRADE STAGE 3: 14
BH CV STRESS HR STAGE 1: 90
BH CV STRESS HR STAGE 2: 101
BH CV STRESS HR STAGE 3: 129
BH CV STRESS METS STAGE 1: 5
BH CV STRESS METS STAGE 2: 7.5
BH CV STRESS METS STAGE 3: 10
BH CV STRESS NUCLEAR ISOTOPE DOSE: 30.5 MCI
BH CV STRESS PROTOCOL 1: NORMAL
BH CV STRESS RECOVERY BP: NORMAL MMHG
BH CV STRESS RECOVERY HR: 77 BPM
BH CV STRESS SPEED STAGE 1: 1.7
BH CV STRESS SPEED STAGE 2: 2.5
BH CV STRESS SPEED STAGE 3: 3.4
BH CV STRESS STAGE 1: 1
BH CV STRESS STAGE 2: 2
BH CV STRESS STAGE 3: 3
LV EF NUC BP: 61 %
MAXIMAL PREDICTED HEART RATE: 159 BPM
PERCENT MAX PREDICTED HR: 81.13 %
STRESS BASELINE BP: NORMAL MMHG
STRESS BASELINE HR: 62 BPM
STRESS PERCENT HR: 95 %
STRESS POST ESTIMATED WORKLOAD: 10 METS
STRESS POST EXERCISE DUR MIN: 9 MIN
STRESS POST EXERCISE DUR SEC: 0 SEC
STRESS POST PEAK BP: NORMAL MMHG
STRESS POST PEAK HR: 129 BPM
STRESS TARGET HR: 135 BPM

## 2022-01-07 NOTE — TELEPHONE ENCOUNTER
----- Message from Wale Liz MD sent at 1/7/2022 10:52 AM CST -----  Negative for ischemia. Low risk for surgery if any planned    1/7/22   Pt informed.  A letter will be sent to the pt for his clearance since he has not been referred yet by his pcp for the colonoscopy.  Pt can take letter to them once he gets that appt made.  I have instructed him on how to stop the Effient and stay on the ASA before his procedure.  He stated understanding.  Elgin Chavez, CMA

## 2022-01-18 DIAGNOSIS — Z11.59 SCREENING FOR VIRAL DISEASE: Primary | ICD-10-CM

## 2022-02-08 ENCOUNTER — TELEPHONE (OUTPATIENT)
Dept: GASTROENTEROLOGY | Age: 62
End: 2022-02-08

## 2022-02-08 NOTE — TELEPHONE ENCOUNTER
Patient: Paula Esposito    YOB: 1960      Clearance was received on February 8, 2022. for Colonoscopy scheduled for: 2/17/22    Patient may discontinue the use of BRILINTA  for 5  days prior to the procedure.  DO NOT STOP ASA    IS Lovenox required:  NO    PATIENT NOTIFIED ON:  2/8/22      Clearance scanned into media

## 2022-02-14 ENCOUNTER — TELEPHONE (OUTPATIENT)
Dept: GASTROENTEROLOGY | Age: 62
End: 2022-02-14

## 2022-02-14 NOTE — TELEPHONE ENCOUNTER
Cory Harp called in regards to upcoming [address] on 2/17. He wishes to cancel at this time due to rise in Covid cases. Will call back to reschedule for later date. If needed, best time to reach him is anytime @ [phone]. Thank you.

## 2022-07-06 RX ORDER — ATORVASTATIN CALCIUM 40 MG/1
TABLET, FILM COATED ORAL
Qty: 90 TABLET | Refills: 3 | Status: SHIPPED | OUTPATIENT
Start: 2022-07-06

## 2022-07-06 RX ORDER — ASPIRIN 81 MG/1
TABLET, COATED ORAL
Qty: 90 TABLET | Refills: 3 | Status: SHIPPED | OUTPATIENT
Start: 2022-07-06

## 2022-11-14 RX ORDER — PRASUGREL 5 MG/1
TABLET, FILM COATED ORAL
Qty: 90 TABLET | Refills: 3 | Status: SHIPPED | OUTPATIENT
Start: 2022-11-14

## 2022-12-30 ENCOUNTER — OFFICE VISIT (OUTPATIENT)
Dept: CARDIOLOGY | Facility: CLINIC | Age: 62
End: 2022-12-30

## 2022-12-30 VITALS
DIASTOLIC BLOOD PRESSURE: 82 MMHG | BODY MASS INDEX: 27.9 KG/M2 | SYSTOLIC BLOOD PRESSURE: 134 MMHG | HEART RATE: 60 BPM | WEIGHT: 206 LBS | OXYGEN SATURATION: 97 % | HEIGHT: 72 IN

## 2022-12-30 DIAGNOSIS — I10 PRIMARY HYPERTENSION: ICD-10-CM

## 2022-12-30 DIAGNOSIS — I25.10 CORONARY ARTERY DISEASE INVOLVING NATIVE CORONARY ARTERY OF NATIVE HEART WITHOUT ANGINA PECTORIS: Primary | ICD-10-CM

## 2022-12-30 DIAGNOSIS — E78.5 HYPERLIPIDEMIA LDL GOAL <70: ICD-10-CM

## 2022-12-30 PROCEDURE — 93000 ELECTROCARDIOGRAM COMPLETE: CPT | Performed by: INTERNAL MEDICINE

## 2022-12-30 PROCEDURE — 99214 OFFICE O/P EST MOD 30 MIN: CPT | Performed by: INTERNAL MEDICINE

## 2022-12-30 RX ORDER — METOPROLOL SUCCINATE 50 MG/1
50 TABLET, EXTENDED RELEASE ORAL DAILY
Qty: 90 TABLET | Refills: 3 | Status: SHIPPED | OUTPATIENT
Start: 2022-12-30

## 2022-12-30 NOTE — ASSESSMENT & PLAN NOTE
The patient denies chest pain, shortness of breath, dyspnea on exertion, orthopnea, PND, edema. There is no evidence of ongoing ischemia. Will stop asa for now but have advised him to restart asa any time the effient is held for other procedures

## 2023-07-24 ENCOUNTER — OFFICE VISIT (OUTPATIENT)
Dept: FAMILY MEDICINE CLINIC | Age: 63
End: 2023-07-24
Payer: COMMERCIAL

## 2023-07-24 VITALS
SYSTOLIC BLOOD PRESSURE: 136 MMHG | DIASTOLIC BLOOD PRESSURE: 88 MMHG | OXYGEN SATURATION: 98 % | TEMPERATURE: 98.6 F | HEIGHT: 72 IN | HEART RATE: 68 BPM | WEIGHT: 208 LBS | BODY MASS INDEX: 28.17 KG/M2

## 2023-07-24 DIAGNOSIS — C61 PROSTATE CANCER (HCC): ICD-10-CM

## 2023-07-24 DIAGNOSIS — Z00.00 ENCOUNTER FOR ANNUAL PHYSICAL EXAM: ICD-10-CM

## 2023-07-24 DIAGNOSIS — Z12.11 ENCOUNTER FOR SCREENING COLONOSCOPY: Primary | ICD-10-CM

## 2023-07-24 LAB
ALBUMIN SERPL-MCNC: 4.4 G/DL (ref 3.5–5.2)
ALP SERPL-CCNC: 69 U/L (ref 40–130)
ALT SERPL-CCNC: 21 U/L (ref 5–41)
ANION GAP SERPL CALCULATED.3IONS-SCNC: 14 MMOL/L (ref 7–19)
AST SERPL-CCNC: 18 U/L (ref 5–40)
BASOPHILS # BLD: 0.1 K/UL (ref 0–0.2)
BASOPHILS NFR BLD: 0.7 % (ref 0–1)
BILIRUB SERPL-MCNC: 1 MG/DL (ref 0.2–1.2)
BUN SERPL-MCNC: 14 MG/DL (ref 8–23)
CALCIUM SERPL-MCNC: 9.1 MG/DL (ref 8.8–10.2)
CHLORIDE SERPL-SCNC: 101 MMOL/L (ref 98–111)
CHOLEST SERPL-MCNC: 197 MG/DL (ref 160–199)
CO2 SERPL-SCNC: 24 MMOL/L (ref 22–29)
CREAT SERPL-MCNC: 0.9 MG/DL (ref 0.5–1.2)
CREAT UR-MCNC: 199.5 MG/DL (ref 39–259)
EOSINOPHIL # BLD: 0.1 K/UL (ref 0–0.6)
EOSINOPHIL NFR BLD: 1.1 % (ref 0–5)
ERYTHROCYTE [DISTWIDTH] IN BLOOD BY AUTOMATED COUNT: 13.1 % (ref 11.5–14.5)
GLUCOSE SERPL-MCNC: 70 MG/DL (ref 74–109)
HCT VFR BLD AUTO: 48.2 % (ref 42–52)
HDLC SERPL-MCNC: 53 MG/DL (ref 55–121)
HGB BLD-MCNC: 15.4 G/DL (ref 14–18)
HIV-1 P24 AG: NORMAL
HIV1+2 AB SERPLBLD QL IA.RAPID: NORMAL
IMM GRANULOCYTES # BLD: 0 K/UL
LDLC SERPL CALC-MCNC: 129 MG/DL
LYMPHOCYTES # BLD: 2.6 K/UL (ref 1.1–4.5)
LYMPHOCYTES NFR BLD: 37.5 % (ref 20–40)
MCH RBC QN AUTO: 28.2 PG (ref 27–31)
MCHC RBC AUTO-ENTMCNC: 32 G/DL (ref 33–37)
MCV RBC AUTO: 88.1 FL (ref 80–94)
MICROALBUMIN UR-MCNC: <1.2 MG/DL (ref 0–19)
MICROALBUMIN/CREAT UR-RTO: NORMAL MG/G
MONOCYTES # BLD: 0.6 K/UL (ref 0–0.9)
MONOCYTES NFR BLD: 8 % (ref 0–10)
NEUTROPHILS # BLD: 3.7 K/UL (ref 1.5–7.5)
NEUTS SEG NFR BLD: 52.3 % (ref 50–65)
PLATELET # BLD AUTO: 246 K/UL (ref 130–400)
PMV BLD AUTO: 10.8 FL (ref 9.4–12.4)
POTASSIUM SERPL-SCNC: 4 MMOL/L (ref 3.5–5)
PROT SERPL-MCNC: 7.5 G/DL (ref 6.6–8.7)
PSA SERPL-MCNC: 0.01 NG/ML (ref 0–4)
RBC # BLD AUTO: 5.47 M/UL (ref 4.7–6.1)
SODIUM SERPL-SCNC: 139 MMOL/L (ref 136–145)
T4 FREE SERPL-MCNC: 1.34 NG/DL (ref 0.93–1.7)
TRIGL SERPL-MCNC: 74 MG/DL (ref 0–149)
TSH SERPL DL<=0.005 MIU/L-ACNC: 0.71 UIU/ML (ref 0.27–4.2)
WBC # BLD AUTO: 7 K/UL (ref 4.8–10.8)

## 2023-07-24 PROCEDURE — 99396 PREV VISIT EST AGE 40-64: CPT | Performed by: PEDIATRICS

## 2023-07-24 RX ORDER — PRASUGREL 5 MG/1
5 TABLET, FILM COATED ORAL DAILY
COMMUNITY

## 2023-07-24 SDOH — ECONOMIC STABILITY: HOUSING INSECURITY
IN THE LAST 12 MONTHS, WAS THERE A TIME WHEN YOU DID NOT HAVE A STEADY PLACE TO SLEEP OR SLEPT IN A SHELTER (INCLUDING NOW)?: NO

## 2023-07-24 SDOH — ECONOMIC STABILITY: INCOME INSECURITY: HOW HARD IS IT FOR YOU TO PAY FOR THE VERY BASICS LIKE FOOD, HOUSING, MEDICAL CARE, AND HEATING?: NOT HARD AT ALL

## 2023-07-24 SDOH — ECONOMIC STABILITY: FOOD INSECURITY: WITHIN THE PAST 12 MONTHS, YOU WORRIED THAT YOUR FOOD WOULD RUN OUT BEFORE YOU GOT MONEY TO BUY MORE.: NEVER TRUE

## 2023-07-24 SDOH — ECONOMIC STABILITY: FOOD INSECURITY: WITHIN THE PAST 12 MONTHS, THE FOOD YOU BOUGHT JUST DIDN'T LAST AND YOU DIDN'T HAVE MONEY TO GET MORE.: NEVER TRUE

## 2023-07-24 ASSESSMENT — ENCOUNTER SYMPTOMS
TROUBLE SWALLOWING: 0
SORE THROAT: 0
ALLERGIC/IMMUNOLOGIC NEGATIVE: 1
WHEEZING: 0
ROS SKIN COMMENTS: NO NEW OR SUSPICIOUS SKIN LESIONS
DIARRHEA: 0
VOMITING: 0
RHINORRHEA: 0
SINUS PRESSURE: 0
BACK PAIN: 0
SHORTNESS OF BREATH: 0
COUGH: 0
NAUSEA: 0
ABDOMINAL PAIN: 0
CHEST TIGHTNESS: 0
CONSTIPATION: 0

## 2023-07-24 ASSESSMENT — PATIENT HEALTH QUESTIONNAIRE - PHQ9
SUM OF ALL RESPONSES TO PHQ QUESTIONS 1-9: 0
SUM OF ALL RESPONSES TO PHQ9 QUESTIONS 1 & 2: 0
2. FEELING DOWN, DEPRESSED OR HOPELESS: 0
SUM OF ALL RESPONSES TO PHQ QUESTIONS 1-9: 0
1. LITTLE INTEREST OR PLEASURE IN DOING THINGS: 0

## 2023-07-25 ENCOUNTER — TELEPHONE (OUTPATIENT)
Dept: FAMILY MEDICINE CLINIC | Age: 63
End: 2023-07-25

## 2023-07-25 DIAGNOSIS — E78.5 HYPERLIPIDEMIA, UNSPECIFIED HYPERLIPIDEMIA TYPE: Primary | ICD-10-CM

## 2023-07-25 LAB — HCV AB SERPL QL IA: NORMAL

## 2023-07-25 RX ORDER — ATORVASTATIN CALCIUM 20 MG/1
20 TABLET, FILM COATED ORAL NIGHTLY
Qty: 90 TABLET | Refills: 3 | Status: SHIPPED | OUTPATIENT
Start: 2023-07-25

## 2023-07-25 NOTE — TELEPHONE ENCOUNTER
----- Message from [employer], DO sent at 7/25/2023  7:57 AM CDT -----  Lipids are elevated. Recommend restarting atorvastatin 20 mg nightly. This should bring you down hopefully into a safe level to prevent heart attacks and strokes. We definitely want to have an LDL less than 100 and preferred less than 70 for you. Your metabolic profile is normal.  This includes kidney and liver functions as well as electrolytes. Your WBC, (infection fighting ability) Hgb and Hct, (oxygen carrying cells) are normal; as is your percentage of each cell type. HIV is nonreactive meaning normal.  There is no significant protein excretion in urine. PSA is nondetectable which is exactly what we want.   Thyroid values are normal.  Hep C antibody still pending

## 2023-07-25 NOTE — TELEPHONE ENCOUNTER
----- Message from [employer], DO sent at 7/25/2023  3:19 PM CDT -----  Hepatitis C is nonreactive, meaning normal

## 2023-07-25 NOTE — TELEPHONE ENCOUNTER
Called patient, spoke with: Patient regarding the results of the patients most recent labs. I advised Patient of Dr. Sivan Ross recommendations.    Patient did voice understanding      Sent rx to pharmacy for pt    Requested Prescriptions     Signed Prescriptions Disp Refills    atorvastatin (LIPITOR) 20 MG tablet 90 tablet 3     Sig: Take 1 tablet by mouth nightly     Authorizing Provider: Tanja Key     Ordering User: Shani Moore

## 2023-08-15 ENCOUNTER — TELEPHONE (OUTPATIENT)
Dept: GASTROENTEROLOGY | Age: 63
End: 2023-08-15

## 2023-08-15 NOTE — TELEPHONE ENCOUNTER
Patient: Simi Barker    YOB: 1960      Clearance was received on August 15, 2023. for Endoscopy / Colonoscopy scheduled for: 9/7/23    Patient may discontinue the use of EFFIENT for 5  days prior to the procedure.   - TAKE ASA WHILE OFF    IS Lovenox required:  NO    PATIENT NOTIFIED ON:  8/15/23 - VOICED UNDERSTANDING      Clearance scanned into media

## 2023-09-01 ENCOUNTER — TELEPHONE (OUTPATIENT)
Dept: GASTROENTEROLOGY | Age: 63
End: 2023-09-01

## 2023-09-01 NOTE — TELEPHONE ENCOUNTER
Called patient to remind them of their procedure with Dr. Anna Edgar  at [facility]  on 8/30/23 to arrive at [id number] [facility]

## 2023-09-06 ENCOUNTER — ANESTHESIA EVENT (OUTPATIENT)
Dept: OPERATING ROOM | Age: 63
End: 2023-09-06

## 2023-09-07 ENCOUNTER — ANESTHESIA (OUTPATIENT)
Dept: OPERATING ROOM | Age: 63
End: 2023-09-07

## 2023-09-07 ENCOUNTER — HOSPITAL ENCOUNTER (OUTPATIENT)
Age: 63
Setting detail: OUTPATIENT SURGERY
Discharge: HOME | End: 2023-09-07
Attending: INTERNAL MEDICINE | Admitting: INTERNAL MEDICINE

## 2023-09-07 ENCOUNTER — APPOINTMENT (OUTPATIENT)
Dept: OPERATING ROOM | Age: 63
End: 2023-09-07
Attending: INTERNAL MEDICINE

## 2023-09-07 ENCOUNTER — HOSPITAL ENCOUNTER (OUTPATIENT)
Age: 63
Setting detail: SPECIMEN
Discharge: HOME | End: 2023-09-07
Payer: COMMERCIAL

## 2023-09-07 VITALS
RESPIRATION RATE: 16 BRPM | DIASTOLIC BLOOD PRESSURE: 81 MMHG | HEART RATE: 73 BPM | OXYGEN SATURATION: 99 % | TEMPERATURE: 98.1 F | SYSTOLIC BLOOD PRESSURE: 148 MMHG

## 2023-09-07 PROCEDURE — 45380 COLONOSCOPY AND BIOPSY: CPT

## 2023-09-07 PROCEDURE — G8918 PT W/O PREOP ORDER IV AB PRO: HCPCS

## 2023-09-07 PROCEDURE — 45385 COLONOSCOPY W/LESION REMOVAL: CPT

## 2023-09-07 PROCEDURE — 88305 TISSUE EXAM BY PATHOLOGIST: CPT

## 2023-09-07 PROCEDURE — G8907 PT DOC NO EVENTS ON DISCHARG: HCPCS

## 2023-09-07 RX ORDER — LIDOCAINE HYDROCHLORIDE 10 MG/ML
INJECTION, SOLUTION EPIDURAL; INFILTRATION; INTRACAUDAL; PERINEURAL PRN
Status: DISCONTINUED | OUTPATIENT
Start: 2023-09-07 | End: 2023-09-07 | Stop reason: SDUPTHER

## 2023-09-07 RX ORDER — METOPROLOL SUCCINATE 50 MG/1
50 TABLET, EXTENDED RELEASE ORAL DAILY
COMMUNITY
Start: 2022-12-30

## 2023-09-07 RX ORDER — SODIUM CHLORIDE, SODIUM LACTATE, POTASSIUM CHLORIDE, CALCIUM CHLORIDE 600; 310; 30; 20 MG/100ML; MG/100ML; MG/100ML; MG/100ML
INJECTION, SOLUTION INTRAVENOUS CONTINUOUS
Status: DISCONTINUED | OUTPATIENT
Start: 2023-09-07 | End: 2023-09-07 | Stop reason: HOSPADM

## 2023-09-07 RX ORDER — PROPOFOL 10 MG/ML
INJECTION, EMULSION INTRAVENOUS PRN
Status: DISCONTINUED | OUTPATIENT
Start: 2023-09-07 | End: 2023-09-07 | Stop reason: SDUPTHER

## 2023-09-07 RX ADMIN — PROPOFOL 300 MG: 10 INJECTION, EMULSION INTRAVENOUS at 12:09

## 2023-09-07 RX ADMIN — SODIUM CHLORIDE, SODIUM LACTATE, POTASSIUM CHLORIDE, CALCIUM CHLORIDE: 600; 310; 30; 20 INJECTION, SOLUTION INTRAVENOUS at 11:11

## 2023-09-07 RX ADMIN — LIDOCAINE HYDROCHLORIDE 30 MG: 10 INJECTION, SOLUTION EPIDURAL; INFILTRATION; INTRACAUDAL; PERINEURAL at 12:09

## 2023-09-07 NOTE — ANESTHESIA PRE PROCEDURE
210 lb (95.3 kg)     There is no height or weight on file to calculate BMI.    CBC:   Lab Results   Component Value Date/Time    WBC 7.0 07/24/2023 03:59 PM    RBC 5.47 07/24/2023 03:59 PM    HGB 15.4 07/24/2023 03:59 PM    HCT 48.2 07/24/2023 03:59 PM    MCV 88.1 07/24/2023 03:59 PM    RDW 13.1 07/24/2023 03:59 PM     07/24/2023 03:59 PM       CMP:   Lab Results   Component Value Date/Time     07/24/2023 03:59 PM    K 4.0 07/24/2023 03:59 PM     07/24/2023 03:59 PM    CO2 24 07/24/2023 03:59 PM    BUN 14 07/24/2023 03:59 PM    CREATININE 0.9 07/24/2023 03:59 PM    LABGLOM >60 07/24/2023 03:59 PM    GLUCOSE 70 07/24/2023 03:59 PM    PROT 7.5 07/24/2023 03:59 PM    CALCIUM 9.1 07/24/2023 03:59 PM    BILITOT 1.0 07/24/2023 03:59 PM    ALKPHOS 69 07/24/2023 03:59 PM    AST 18 07/24/2023 03:59 PM    ALT 21 07/24/2023 03:59 PM       POC Tests: No results for input(s): POCGLU, POCNA, POCK, POCCL, POCBUN, POCHEMO, POCHCT in the last 72 hours.     Coags: No results found for: PROTIME, INR, APTT    HCG (If Applicable): No results found for: PREGTESTUR, PREGSERUM, HCG, HCGQUANT     ABGs: No results found for: PHART, PO2ART, CBM5TRU, XBI6RSL, BEART, B9KLAYIE     Type & Screen (If Applicable):  No results found for: LABABO, LABRH    Drug/Infectious Status (If Applicable):  No results found for: HIV, HEPCAB    COVID-19 Screening (If Applicable): No results found for: COVID19        Anesthesia Evaluation  Patient summary reviewed and Nursing notes reviewed  Airway: Mallampati: II  TM distance: >3 FB   Neck ROM: full  Mouth opening: > = 3 FB   Dental: normal exam         Pulmonary:Negative Pulmonary ROS and normal exam                               Cardiovascular:Negative CV ROS  Exercise tolerance: good (>4 METS),                     Neuro/Psych:   Negative Neuro/Psych ROS              GI/Hepatic/Renal: Neg GI/Hepatic/Renal ROS            Endo/Other: Negative Endo/Other ROS                    Abdominal: normal

## 2023-09-07 NOTE — OP NOTE
Patient: Kae Morin : 1960  Med Rec#: [id number] Acc#: [id number]   Primary Care Provider AMISH Davies DO    Date of Procedure:  2023    Endoscopist: Woodrow Fernandez MD    Referring Provider: Jose Gabriel DO    Operation Performed:     Colonoscopy with snare polypectomy  Colonoscopy with biopsy    Indications: Screening    Anesthesia:  Sedation was administered by anesthesia who monitored the patient during the procedure. I met with Kae Morin prior to procedure. We discussed the procedure itself, and I have discussed the risks of endoscopy (including-- but not limited to-- pain, discomfort, bleeding potentially requiring second endoscopic procedure and/or blood transfusion, organ perforation requiring operative repair, damage to organs near the colon, infection, aspiration, cardiopulmonary/allergic reaction), benefits, indications to endoscopy. Additionally, we discussed options other than colonoscopy. The patient expressed understanding. All questions answered. The patient decided to proceed with the procedure. Signed informed consent was placed on the chart. Blood Loss: minimal    Withdrawal time: > 6 min  Bowel Prep: adequate     Complications: no immediate complications    DESCRIPTION OF PROCEDURE:     A time out was performed. After written informed consent was obtained, the patient was placed in the left lateral position. The perianal area was inspected, and a digital rectal exam was performed. A rectal exam was performed: normal tone, no palpable lesions. At this point, a forward viewing Olympus colonoscope was inserted into the anus and carefully advanced to the cecum. The cecum was identified by the ileocecal valve and the appendiceal orifice. The colonoscope was then slowly withdrawn with careful inspection of the mucosa in a linear and circumferential fashion. The scope was retroflexed in the rectum.  Suction was utilized during the procedure to remove as much air as possible from the bowel. The colonoscope was removed from the patient, and the procedure was terminated. Findings are listed below. Findings: The mucosa appeared normal throughout the entire examined colon. In the ascending colon, a 7 mm sessile polyp was removed completely with cold snare polypectomy. In the descending colon, a  diminutive  polyp was removed completely with forceps polypectomy. Retroflexion in the rectum was normal and revealed no further abnormalities. Recommendations:  1. Repeat colonoscopy: pending pathology - 5 years  2. Await biopsy results    Findings and recommendations were discussed w/ the patient. A copy of the images was provided. Noel Majano am scribing for and in the presence of Dr. Forrest Giordano MD.  Electronically signed by Nii Lofton RN on 9/7/2023 at 11:21 AM    I personally performed the services described in this documentation as scribed by Estefania Byrd, and it appears accurate and complete.      Forrest Giordano MD  9/7/2023

## 2023-09-07 NOTE — DISCHARGE INSTRUCTIONS
Recommendations:  1. Repeat colonoscopy: pending pathology - 5 years  2. Await biopsy results    POST-OP ORDERS: ENDOSCOPY & COLONOSCOPY:    1. Rest today. 2. DO NOT eat or drink until wide awake; eat your usual diet today in moderate amount only. 3. DO NOT drive today. 4. Call physician if you have severe pain, vomiting, fever, rectal bleeding or black bowel movements. 5.  If a biopsy was taken or a polyp removed, you should expect to hear results in about 7-10 days. If you have heard nothing from your physician by then, call the office for results. 6.  Discharge home when patient awake, vitals signs stable and tolerating liquids. 7. Call with questions or concerns [phone].

## 2023-09-07 NOTE — ANESTHESIA POSTPROCEDURE EVALUATION
Department of Anesthesiology  Postprocedure Note    Patient: Monika Moya  MRN: [id number]  YOB: 1960  Date of evaluation: 9/7/2023      Procedure Summary     Date: 09/07/23 Room / Location: [facility] / [address]    Anesthesia Start: 1015 Anesthesia Stop:     Procedure: COLORECTAL CANCER SCREENING, NOT HIGH RISK (Abdomen) Diagnosis:       Screen for colon cancer      (Screen for colon cancer [Z12.11])    Surgeons: Jacqueline Day MD Responsible Provider: ODALYS Bobo CRNA    Anesthesia Type: general, TIVA ASA Status: 3          Anesthesia Type: No value filed.     Alvin Phase I:      Alvin Phase II:        Anesthesia Post Evaluation    Patient location during evaluation: bedside  Patient participation: complete - patient participated  Level of consciousness: sleepy but conscious  Pain score: 0  Airway patency: patent  Nausea & Vomiting: no nausea and no vomiting  Complications: no  Cardiovascular status: blood pressure returned to baseline  Respiratory status: acceptable, room air and spontaneous ventilation  Hydration status: euvolemic  Pain management: adequate

## 2023-12-11 ENCOUNTER — OFFICE VISIT (OUTPATIENT)
Dept: FAMILY MEDICINE CLINIC | Age: 63
End: 2023-12-11
Payer: COMMERCIAL

## 2023-12-11 VITALS
OXYGEN SATURATION: 96 % | DIASTOLIC BLOOD PRESSURE: 81 MMHG | HEART RATE: 82 BPM | SYSTOLIC BLOOD PRESSURE: 137 MMHG | TEMPERATURE: 98.1 F | BODY MASS INDEX: 27.67 KG/M2 | WEIGHT: 204 LBS

## 2023-12-11 DIAGNOSIS — R09.81 NASAL CONGESTION: ICD-10-CM

## 2023-12-11 DIAGNOSIS — H65.92 LEFT OTITIS MEDIA WITH EFFUSION: Primary | ICD-10-CM

## 2023-12-11 DIAGNOSIS — J06.9 VIRAL URI: ICD-10-CM

## 2023-12-11 LAB
INFLUENZA A ANTIBODY: NEGATIVE
INFLUENZA B ANTIBODY: NEGATIVE

## 2023-12-11 PROCEDURE — 96372 THER/PROPH/DIAG INJ SC/IM: CPT | Performed by: NURSE PRACTITIONER

## 2023-12-11 PROCEDURE — 99213 OFFICE O/P EST LOW 20 MIN: CPT | Performed by: NURSE PRACTITIONER

## 2023-12-11 RX ORDER — TRIAMCINOLONE ACETONIDE 40 MG/ML
40 INJECTION, SUSPENSION INTRA-ARTICULAR; INTRAMUSCULAR ONCE
Status: COMPLETED | OUTPATIENT
Start: 2023-12-11 | End: 2023-12-11

## 2023-12-11 RX ORDER — AZITHROMYCIN 250 MG/1
250 TABLET, FILM COATED ORAL SEE ADMIN INSTRUCTIONS
Qty: 6 TABLET | Refills: 0 | Status: SHIPPED | OUTPATIENT
Start: 2023-12-11 | End: 2023-12-16

## 2023-12-11 RX ORDER — DEXAMETHASONE SODIUM PHOSPHATE 10 MG/ML
4 INJECTION INTRAMUSCULAR; INTRAVENOUS ONCE
Status: COMPLETED | OUTPATIENT
Start: 2023-12-11 | End: 2023-12-11

## 2023-12-11 RX ADMIN — TRIAMCINOLONE ACETONIDE 40 MG: 40 INJECTION, SUSPENSION INTRA-ARTICULAR; INTRAMUSCULAR at 14:46

## 2023-12-11 RX ADMIN — DEXAMETHASONE SODIUM PHOSPHATE 4 MG: 10 INJECTION INTRAMUSCULAR; INTRAVENOUS at 14:45

## 2023-12-11 ASSESSMENT — ENCOUNTER SYMPTOMS
ABDOMINAL PAIN: 0
BACK PAIN: 0
COUGH: 1
SORE THROAT: 0
WHEEZING: 1
SHORTNESS OF BREATH: 0

## 2023-12-13 ENCOUNTER — TELEPHONE (OUTPATIENT)
Dept: FAMILY MEDICINE CLINIC | Age: 63
End: 2023-12-13

## 2023-12-13 RX ORDER — BENZONATATE 200 MG/1
200 CAPSULE ORAL 3 TIMES DAILY PRN
Qty: 21 CAPSULE | Refills: 0 | Status: SHIPPED | OUTPATIENT
Start: 2023-12-13 | End: 2023-12-20

## 2023-12-13 NOTE — TELEPHONE ENCOUNTER
Call returned to pts wife to let her know that Masonlorenza sent rx to pharmacy for pt.  She said she hopes these work cause he isn't sleeping and these haven't worked for him very well in the past.

## 2023-12-13 NOTE — TELEPHONE ENCOUNTER
Patient wife called in stating patient was seen 12/11/2023 by Ailin Tesfaye, he still has a horrible cough and would like cough medication called in if possible.

## 2023-12-19 RX ORDER — PRASUGREL 5 MG/1
TABLET, FILM COATED ORAL
Qty: 90 TABLET | Refills: 3 | Status: SHIPPED | OUTPATIENT
Start: 2023-12-19

## 2024-01-05 ENCOUNTER — OFFICE VISIT (OUTPATIENT)
Dept: CARDIOLOGY | Facility: CLINIC | Age: 64
End: 2024-01-05
Payer: COMMERCIAL

## 2024-01-05 VITALS
WEIGHT: 206 LBS | OXYGEN SATURATION: 99 % | SYSTOLIC BLOOD PRESSURE: 120 MMHG | HEART RATE: 67 BPM | HEIGHT: 72 IN | DIASTOLIC BLOOD PRESSURE: 70 MMHG | BODY MASS INDEX: 27.9 KG/M2

## 2024-01-05 DIAGNOSIS — E78.5 HYPERLIPIDEMIA LDL GOAL <70: Primary | ICD-10-CM

## 2024-01-05 DIAGNOSIS — I10 PRIMARY HYPERTENSION: ICD-10-CM

## 2024-01-05 DIAGNOSIS — I25.10 CORONARY ARTERY DISEASE INVOLVING NATIVE CORONARY ARTERY OF NATIVE HEART WITHOUT ANGINA PECTORIS: ICD-10-CM

## 2024-01-05 PROCEDURE — 99214 OFFICE O/P EST MOD 30 MIN: CPT | Performed by: INTERNAL MEDICINE

## 2024-01-05 PROCEDURE — 93000 ELECTROCARDIOGRAM COMPLETE: CPT | Performed by: INTERNAL MEDICINE

## 2024-01-05 RX ORDER — ATORVASTATIN CALCIUM 40 MG/1
40 TABLET, FILM COATED ORAL
Qty: 90 TABLET | Refills: 3 | Status: SHIPPED | OUTPATIENT
Start: 2024-01-05

## 2024-01-05 NOTE — PROGRESS NOTES
Referring Provider: Freddy Germain DO    Reason for Follow-up Visit: CAD    Subjective .   Chief Complaint:   Chief Complaint   Patient presents with    Coronary Artery Disease     Yearly  pt states he has been doing good with no symptoms.  He said he sometimes feels anxious and feels the heart skip.       Hypertension     Pt states this is real good.    Hyperlipidemia     Last lab done 7/2023 LDL was 129 on Lipitor 40 mg.  Pt had stopped the Lipitor.         History of present illness:  Wale Valente is a 63 y.o. yo male with CAD, s/p MONAE after a STEMI in 2020 in for routine follow up. He denies any chest pain or SOB.       History  Past Medical History:   Diagnosis Date    Cancer     prostate cancer    Coronary artery disease     Hyperlipidemia     Myocardial infarction     Primary hypertension 12/30/2022   ,   Past Surgical History:   Procedure Laterality Date    CARDIAC CATHETERIZATION N/A 6/14/2020    Procedure: Left Heart Cath;  Surgeon: Wale Liz MD;  Location:  [facility];  Service: Cardiovascular;  Laterality: N/A;    CORONARY STENT PLACEMENT      PROSTATECTOMY     ,   Family History   Problem Relation Age of Onset    Cancer Mother     Cancer Father     Prostate cancer Brother     No Known Problems Brother    ,   Social History     Tobacco Use    Smoking status: Never    Smokeless tobacco: Never   Vaping Use    Vaping Use: Never used   Substance Use Topics    Alcohol use: Yes     Alcohol/week: 2.0 standard drinks of alcohol     Types: 2 Cans of beer per week     Comment: one to two cans a week in the summer    Drug use: Never   ,     Medications  Current Outpatient Medications   Medication Sig Dispense Refill    metoprolol succinate XL (Toprol XL) 50 MG 24 hr tablet Take 1 tablet by mouth Daily. 90 tablet 3    nitroglycerin (NITROSTAT) 0.4 MG SL tablet PLACE 1 TABLET UNDER TONGUE EVERY 5 MINS, UP TO 3 DOSES AS NEEDED FOR CHEST PAIN 100 tablet 11    prasugrel (EFFIENT) 5 MG  "tablet TAKE 1 TABLET BY MOUTH EVERY DAY 90 tablet 3    Aspirin Low Dose 81 MG EC tablet TAKE 1 TABLET BY MOUTH EVERY DAY 90 tablet 3    atorvastatin (LIPITOR) 40 MG tablet TAKE 1 TABLET BY MOUTH EVERYDAY AT BEDTIME (Patient not taking: Reported on 1/5/2024) 90 tablet 3    meloxicam (MOBIC) 15 MG tablet As Needed. (Patient not taking: Reported on 1/5/2024)       No current facility-administered medications for this visit.     Facility-Administered Medications Ordered in Other Visits   Medication Dose Route Frequency Provider Last Rate Last Admin    heparin infusion 2 units/mL in 0.9% NaCl    PRN Wale Liz MD   1,000 mL at 06/14/20 1342    iopamidol (ISOVUE-370) 76 % injection    PRN Wale Liz MD   162 mL at 06/14/20 1345    lidocaine (cardiac) (XYLOCAINE) injection    PRN Wale Liz MD   10 mL at 06/14/20 1323       Allergies:  Patient has no known allergies.    Review of Systems  Review of Systems   HENT:  Negative for nosebleeds.    Cardiovascular:  Negative for chest pain, claudication, dyspnea on exertion, leg swelling, near-syncope, orthopnea, palpitations, paroxysmal nocturnal dyspnea and syncope.   Respiratory:  Negative for hemoptysis and shortness of breath.    Musculoskeletal:  Positive for back pain.   Gastrointestinal:  Negative for melena.   Genitourinary:  Negative for hematuria.       Objective     Physical Exam:  /70   Pulse 67   Ht 182.9 cm (72\")   Wt 93.4 kg (206 lb)   SpO2 99%   BMI 27.94 kg/m²   Pulmonary:      Effort: Pulmonary effort is normal.      Breath sounds: Normal breath sounds.   Cardiovascular:      Normal rate. Regular rhythm.      Murmurs: There is no murmur.   Edema:     Peripheral edema absent.         Results Review:    ECG 12 Lead    Date/Time: 1/5/2024 8:53 AM  Performed by: Wale Liz MD    Authorized by: Wale Liz MD  Comparison: compared with previous ECG   Similar to previous ECG  Rhythm: sinus rhythm  Rate: normal  Conduction: conduction " normal  ST Segments: ST segments normal  T Waves: T waves normal  QRS axis: normal    Clinical impression: normal ECG          Hospital Outpatient Visit on 01/06/2022   Component Date Value Ref Range Status    Target HR (85%) 01/06/2022 135  bpm Final    Max. Pred. HR (100%) 01/06/2022 159  bpm Final    BH CV STRESS PROTOCOL 1 01/06/2022 Shin   Final    Stage 1 01/06/2022 1   Final    HR Stage 1 01/06/2022 90   Final    BP Stage 1 01/06/2022 160/77   Final    Duration Min Stage 1 01/06/2022 3   Final    Duration Sec Stage 1 01/06/2022 0   Final    Grade Stage 1 01/06/2022 10   Final    Speed Stage 1 01/06/2022 1.7   Final    BH CV STRESS METS STAGE 1 01/06/2022 5   Final    Stage 2 01/06/2022 2   Final    HR Stage 2 01/06/2022 101   Final    BP Stage 2 01/06/2022 166/77   Final    Duration Min Stage 2 01/06/2022 3   Final    Duration Sec Stage 2 01/06/2022 0   Final    Grade Stage 2 01/06/2022 12   Final    Speed Stage 2 01/06/2022 2.5   Final    BH CV STRESS METS STAGE 2 01/06/2022 7.5   Final    Stage 3 01/06/2022 3   Final    HR Stage 3 01/06/2022 129   Final    BP Stage 3 01/06/2022 175/69   Final    Duration Min Stage 3 01/06/2022 3   Final    Duration Sec Stage 3 01/06/2022 0   Final    Grade Stage 3 01/06/2022 14   Final    Speed Stage 3 01/06/2022 3.4   Final    BH CV STRESS METS STAGE 3 01/06/2022 10.0   Final    Baseline HR 01/06/2022 62  bpm Final    Baseline BP 01/06/2022 138/75  mmHg Final    Peak HR 01/06/2022 129  bpm Final    Percent Max Pred HR 01/06/2022 81.13  % Final    Percent Target HR 01/06/2022 95  % Final    Peak BP 01/06/2022 175/69  mmHg Final    Recovery HR 01/06/2022 77  bpm Final    Recovery BP 01/06/2022 176/80  mmHg Final    Exercise duration (min) 01/06/2022 9  min Final    Exercise duration (sec) 01/06/2022 0  sec Final    Estimated workload 01/06/2022 10.0  METS Final    BH CV REST NUCLEAR ISOTOPE DOSE 01/06/2022 10.9  mCi Final    BH CV STRESS NUCLEAR ISOTOPE DOSE 01/06/2022 30.5   mCi Final    Nuc Stress EF 01/06/2022 61  % Final       Assessment & Plan   Problem List Items Addressed This Visit          Cardiac and Vasculature    Coronary artery disease involving native coronary artery of native heart without angina pectoris    Current Assessment & Plan     The patient denies chest pain, shortness of breath, dyspnea on exertion, orthopnea, PND, edema. There is no evidence of ongoing ischemia           Hyperlipidemia LDL goal <70    Current Assessment & Plan     He quit taking his lipitor. His LDL is very high at 129. He says he will restart it tonight         Primary hypertension - Primary    Current Assessment & Plan     Good control            Medical Complexity  must have 2 out of 3     Moderate Complexity Level 4           1 of the following medical problems:          []One chronic illness with mild exacerbation         [x]Two or more stable chronic illness          [x]One new problem  []One acute illness with systemic symptoms    Complexity of Data  Reviewed (1 out of the 3 following categories)      Category 1 tests, documents, historian (must have 3 points)       []Review of prior external records  [x]Review of results of unique tests  []Ordering unique tests  []Assessment requires an independent historian    Category 2 Interpretation of tests   []Independent interpretation of test read by another doc    Category 3 Discuss Management/tests  []Discussion with external physician    Risk of complications and/or morbidity          [x]Prescription Drug Management

## 2024-02-14 ENCOUNTER — LAB (OUTPATIENT)
Dept: INTERNAL MEDICINE | Facility: CLINIC | Age: 64
End: 2024-02-14
Payer: COMMERCIAL

## 2024-02-15 LAB
ALBUMIN SERPL-MCNC: 4.7 G/DL (ref 3.9–4.9)
ALBUMIN/GLOB SERPL: 2.1 {RATIO} (ref 1.2–2.2)
ALP SERPL-CCNC: 72 IU/L (ref 44–121)
ALT SERPL-CCNC: 25 IU/L (ref 0–44)
AST SERPL-CCNC: 22 IU/L (ref 0–40)
BILIRUB SERPL-MCNC: 0.9 MG/DL (ref 0–1.2)
BUN SERPL-MCNC: 15 MG/DL (ref 8–27)
BUN/CREAT SERPL: 15 (ref 10–24)
CALCIUM SERPL-MCNC: 9.7 MG/DL (ref 8.6–10.2)
CHLORIDE SERPL-SCNC: 103 MMOL/L (ref 96–106)
CHOLEST SERPL-MCNC: 135 MG/DL (ref 100–199)
CO2 SERPL-SCNC: 23 MMOL/L (ref 20–29)
CREAT SERPL-MCNC: 1 MG/DL (ref 0.76–1.27)
EGFRCR SERPLBLD CKD-EPI 2021: 85 ML/MIN/1.73
GLOBULIN SER CALC-MCNC: 2.2 G/DL (ref 1.5–4.5)
GLUCOSE SERPL-MCNC: 87 MG/DL (ref 70–99)
HDLC SERPL-MCNC: 51 MG/DL
LDLC SERPL CALC-MCNC: 72 MG/DL (ref 0–99)
LPA SERPL-SCNC: 50.5 NMOL/L
POTASSIUM SERPL-SCNC: 4.6 MMOL/L (ref 3.5–5.2)
PROT SERPL-MCNC: 6.9 G/DL (ref 6–8.5)
SODIUM SERPL-SCNC: 141 MMOL/L (ref 134–144)
TRIGL SERPL-MCNC: 57 MG/DL (ref 0–149)
VLDLC SERPL CALC-MCNC: 12 MG/DL (ref 5–40)

## 2024-03-11 ENCOUNTER — TELEPHONE (OUTPATIENT)
Dept: INTERNAL MEDICINE | Facility: CLINIC | Age: 64
End: 2024-03-11
Payer: COMMERCIAL

## 2024-03-11 RX ORDER — METOPROLOL SUCCINATE 50 MG/1
50 TABLET, EXTENDED RELEASE ORAL DAILY
Qty: 90 TABLET | Refills: 3 | Status: SHIPPED | OUTPATIENT
Start: 2024-03-11

## 2024-03-11 NOTE — TELEPHONE ENCOUNTER
PATIENT CAME INTO [facility] OFFICE - HE IS OUT OF   metoprolol succinate XL (Toprol XL) 50 MG 24 hr tablet

## 2024-07-11 ENCOUNTER — TELEPHONE (OUTPATIENT)
Dept: CARDIOLOGY | Facility: CLINIC | Age: 64
End: 2024-07-11

## 2024-07-11 NOTE — TELEPHONE ENCOUNTER
Caller: Ursula Valente    Relationship to patient: Emergency Contact    Best call back number: [phone]    Chief complaint: NOT BEEN FEELING WELL    Type of visit: FOLLOW UP    Requested date: ASAP     Additional notes: FORMER DR. PENNY PATIENT.  CAN COME TO [facility] OR [facility] TO BE SEEN         ARTIFICIAL TEARS to affected eye(s) as needed.

## 2024-07-24 ENCOUNTER — OFFICE VISIT (OUTPATIENT)
Dept: CARDIOLOGY | Facility: CLINIC | Age: 64
End: 2024-07-24
Payer: COMMERCIAL

## 2024-07-24 VITALS
BODY MASS INDEX: 27.9 KG/M2 | HEART RATE: 79 BPM | DIASTOLIC BLOOD PRESSURE: 75 MMHG | HEIGHT: 72 IN | WEIGHT: 206 LBS | SYSTOLIC BLOOD PRESSURE: 126 MMHG

## 2024-07-24 DIAGNOSIS — Z95.5 PRESENCE OF DRUG COATED STENT IN LAD CORONARY ARTERY: ICD-10-CM

## 2024-07-24 DIAGNOSIS — I25.2 HISTORY OF ST ELEVATION MYOCARDIAL INFARCTION (STEMI): ICD-10-CM

## 2024-07-24 DIAGNOSIS — E78.5 HYPERLIPIDEMIA LDL GOAL <70: ICD-10-CM

## 2024-07-24 DIAGNOSIS — I20.89 STABLE ANGINA PECTORIS: ICD-10-CM

## 2024-07-24 DIAGNOSIS — I10 PRIMARY HYPERTENSION: ICD-10-CM

## 2024-07-24 DIAGNOSIS — I25.10 CORONARY ARTERY DISEASE INVOLVING NATIVE CORONARY ARTERY OF NATIVE HEART WITHOUT ANGINA PECTORIS: Primary | ICD-10-CM

## 2024-07-24 DIAGNOSIS — R06.09 DYSPNEA ON EXERTION: ICD-10-CM

## 2024-07-24 RX ORDER — ATORVASTATIN CALCIUM 40 MG/1
40 TABLET, FILM COATED ORAL
Qty: 90 TABLET | Refills: 3 | Status: SHIPPED | OUTPATIENT
Start: 2024-07-24

## 2024-07-24 RX ORDER — METOPROLOL SUCCINATE 50 MG/1
50 TABLET, EXTENDED RELEASE ORAL DAILY
Qty: 90 TABLET | Refills: 3 | Status: SHIPPED | OUTPATIENT
Start: 2024-07-24

## 2024-07-24 RX ORDER — PRASUGREL 5 MG/1
5 TABLET, FILM COATED ORAL DAILY
Qty: 90 TABLET | Refills: 3 | Status: SHIPPED | OUTPATIENT
Start: 2024-07-24

## 2024-07-25 RX ORDER — NITROGLYCERIN 0.4 MG/1
TABLET SUBLINGUAL
Qty: 20 TABLET | Refills: 1 | Status: SHIPPED | OUTPATIENT
Start: 2024-07-25

## 2024-07-28 PROBLEM — R06.09 DYSPNEA ON EXERTION: Status: ACTIVE | Noted: 2024-07-28

## 2024-07-28 PROBLEM — I20.89 STABLE ANGINA PECTORIS: Status: ACTIVE | Noted: 2024-07-28

## 2024-07-28 NOTE — PROGRESS NOTES
[facility] - CARDIOLOGY  New Patient Initial Outpatient Evaulation    Primary Care Physician: Freddy Germain DO    Subjective     Chief Complaint   Patient presents with    Coronary Artery Disease     6 mo f/u - former Dr. Liz patient        History of Present Illness  History of Present Illness  The patient is a 63-year-old male who presents for evaluation of chest discomfort.    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 This discomfort, described as a dull pressure, is reminiscent of his previous heart attack four years ago. He was transported to [facility] where he was given nitroglycerin and two stents were placed in the main artery. His work is stressful, but he generally feels well over the past four years. He attributes his stress to financial issues. He is unsure if he is on the correct medication. His cholesterol medication was initially prescribed by Dr. Luis, which resulted in a heart attack five months later. He stopped taking his cholesterol medication regularly, but Dr. Liz advised him to resume it. He was taking 40 mg of Lipitor in the morning with his other pills, but he ran out of the 20 mg a month ago and has been taking the remaining 20 mg pills. He is also taking metoprolol and Effient. 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    He occasionally experiences irregular heartbeats, typically at night when he is thinking about things. These irregular heartbeats have occurred approximately six times in the past year.    Review of Systems   Constitutional: Negative for diaphoresis, fever and malaise/fatigue.   HENT:  Negative for congestion.    Eyes:  Negative for vision loss in left eye and vision loss in right eye.   Cardiovascular:  Positive for chest pain and irregular heartbeat. Negative for claudication, dyspnea on exertion,  leg swelling, orthopnea, palpitations and syncope.   Respiratory:  Negative for cough, shortness of breath and wheezing.    Hematologic/Lymphatic: Negative for adenopathy.   Skin:  Negative for rash.   Musculoskeletal:  Negative for joint pain and joint swelling.   Gastrointestinal:  Negative for abdominal pain, diarrhea, nausea and vomiting.   Neurological:  Negative for excessive daytime sleepiness, dizziness, focal weakness, light-headedness, numbness and weakness.   Psychiatric/Behavioral:  Negative for depression. The patient does not have insomnia.         Otherwise complete ROS reviewed and negative except as mentioned in the HPI.      Past Medical History:   Past Medical History:   Diagnosis Date    Cancer     prostate cancer    Coronary artery disease     Hyperlipidemia     Myocardial infarction     Primary hypertension 12/30/2022       Past Surgical History:  Past Surgical History:   Procedure Laterality Date    CARDIAC CATHETERIZATION N/A 6/14/2020    Procedure: Left Heart Cath;  Surgeon: Wale Liz MD;  Location:  [facility];  Service: Cardiovascular;  Laterality: N/A;    CORONARY STENT PLACEMENT      PROSTATECTOMY         Family History: family history includes Cancer in his father and mother; No Known Problems in his brother; Prostate cancer in his brother.    Social History:  reports that he has never smoked. He has never used smokeless tobacco. He reports current alcohol use of about 2.0 standard drinks of alcohol per week. He reports that he does not use drugs.    Medications:  Prior to Admission medications    Medication Sig Start Date End Date Taking? Authorizing Provider   atorvastatin (LIPITOR) 40 MG tablet Take 1 tablet by mouth every night at bedtime. 7/24/24  Yes Mook Troy, DO   metoprolol succinate XL (Toprol XL) 50 MG 24 hr tablet Take 1 tablet by mouth Daily. 7/24/24  Yes Mook Troy, DO   prasugrel (EFFIENT) 5 MG tablet Take 1 tablet by  "mouth Daily. 7/24/24  Yes Mook Troy DO   nitroglycerin (NITROSTAT) 0.4 MG SL tablet PLACE 1 TABLET UNDER TONGUE EVERY 5 MINS, UP TO 3 DOSES AS NEEDED FOR CHEST PAIN 7/25/24   Mook Troy DO     Allergies:  No Known Allergies    Objective     Vital Signs: /75   Pulse 79   Ht 182.9 cm (72\")   Wt 93.4 kg (206 lb)   BMI 27.94 kg/m²     Vitals and nursing note reviewed.   Constitutional:       Appearance: Normal and healthy appearance. Well-developed and not in distress.   Eyes:      Extraocular Movements: Extraocular movements intact.      Pupils: Pupils are equal, round, and reactive to light.   HENT:      Head: Normocephalic and atraumatic.    Mouth/Throat:      Pharynx: Oropharynx is clear.   Neck:      Vascular: JVD normal.      Trachea: Trachea normal.   Pulmonary:      Effort: Pulmonary effort is normal.      Breath sounds: Normal breath sounds. No wheezing. No rhonchi. No rales.   Cardiovascular:      PMI at left midclavicular line. Normal rate. Regular rhythm. Normal S1. Normal S2.       Murmurs: There is a grade 2/6 systolic murmur.      No gallop.  No click. No rub.   Pulses:     Dorsalis pedis: 2+ bilaterally.     Posterior tibial: 2+ bilaterally.  Abdominal:      General: Bowel sounds are normal.      Palpations: Abdomen is soft.      Tenderness: There is no abdominal tenderness.   Musculoskeletal: Normal range of motion.      Cervical back: Normal range of motion and neck supple. Skin:     General: Skin is warm and dry.      Capillary Refill: Capillary refill takes less than 2 seconds.   Feet:      Right foot:      Skin integrity: Skin integrity normal.      Left foot:      Skin integrity: Skin integrity normal.   Neurological:      Mental Status: Alert and oriented to person, place and time.      Sensory: Sensation is intact.      Motor: Motor function is intact.      Coordination: Coordination is intact.   Psychiatric:         Speech: Speech normal.         " Behavior: Behavior is cooperative.       Physical Exam      Results Reviewed:    Procedures    Results  Testing  EKG shows STEMI. Stress test from 2.5 years ago showed old heart attack but no new findings.    Lab Results   Component Value Date    CHOL 130 01/05/2022    TRIG 57 02/14/2024    HDL 51 02/14/2024    VLDL 12 02/14/2024    LDLHDL 1.37 01/05/2022     Lab Results   Component Value Date    HGBA1C 5.30 06/15/2020       Assessment / Plan        Problem List Items Addressed This Visit       Coronary artery disease involving native coronary artery of native heart without angina pectoris - Primary    Relevant Medications    prasugrel (EFFIENT) 5 MG tablet    metoprolol succinate XL (Toprol XL) 50 MG 24 hr tablet    Other Relevant Orders    Adult Stress Echo W/ Cont or Stress Agent if Necessary Per Protocol     Other Visit Diagnoses       Stable angina pectoris        Relevant Medications    prasugrel (EFFIENT) 5 MG tablet    metoprolol succinate XL (Toprol XL) 50 MG 24 hr tablet    Other Relevant Orders    Adult Stress Echo W/ Cont or Stress Agent if Necessary Per Protocol    Dyspnea on exertion        Relevant Orders    Adult Transthoracic Echo Complete W/ Cont if Necessary Per Protocol          Assessment & Plan  1. Myocardial infarction.  Given his history of myocardial infarction, it is crucial for him to maintain a high-intensity statin regimen. The 20 mg dosage is insufficient. His last cholesterol check was in 02/2024. A new prescription for Lipitor 40 mg was provided. He was advised to continue taking Effient and metoprolol, and refills for a year were provided. A dobutamine stress test was ordered. An echocardiogram was also ordered. He will be contacted with the test results. Should another heart catheterization be necessary or medication adjustments are necessary, an earlier appointment will be scheduled.    Follow-up  A follow-up visit is scheduled for 6 months from now.      Transcribed from ambient  dictation for Mook Troy DO by Mook Troy DO.  07/28/24   17:21 CDT    Patient or patient representative verbalized consent for the use of Ambient Listening during the visit with  Mook Troy DO for chart documentation. 7/28/2024  17:22 CDT

## 2024-07-30 ENCOUNTER — HOSPITAL ENCOUNTER (OUTPATIENT)
Dept: CARDIOLOGY | Facility: HOSPITAL | Age: 64
Discharge: HOME | End: 2024-07-30
Admitting: EMERGENCY MEDICINE
Payer: COMMERCIAL

## 2024-07-30 VITALS
BODY MASS INDEX: 27.89 KG/M2 | DIASTOLIC BLOOD PRESSURE: 74 MMHG | HEIGHT: 72 IN | WEIGHT: 205.91 LBS | SYSTOLIC BLOOD PRESSURE: 137 MMHG | HEART RATE: 52 BPM

## 2024-07-30 DIAGNOSIS — I20.89 STABLE ANGINA PECTORIS: ICD-10-CM

## 2024-07-30 DIAGNOSIS — I25.10 CORONARY ARTERY DISEASE INVOLVING NATIVE CORONARY ARTERY OF NATIVE HEART WITHOUT ANGINA PECTORIS: ICD-10-CM

## 2024-07-30 PROCEDURE — 93350 STRESS TTE ONLY: CPT

## 2024-07-30 PROCEDURE — 25510000001 PERFLUTREN 6.52 MG/ML SUSPENSION: Performed by: EMERGENCY MEDICINE

## 2024-07-30 PROCEDURE — 93017 CV STRESS TEST TRACING ONLY: CPT

## 2024-07-30 RX ADMIN — PERFLUTREN 8.48 MG: 6.52 INJECTION, SUSPENSION INTRAVENOUS at 10:29

## 2024-08-04 LAB
BH CV STRESS BP STAGE 1: NORMAL
BH CV STRESS BP STAGE 2: NORMAL
BH CV STRESS BP STAGE 3: NORMAL
BH CV STRESS BP STAGE 4: NORMAL
BH CV STRESS DURATION MIN STAGE 1: 3
BH CV STRESS DURATION MIN STAGE 2: 3
BH CV STRESS DURATION MIN STAGE 3: 3
BH CV STRESS DURATION MIN STAGE 4: 1
BH CV STRESS DURATION SEC STAGE 1: 0
BH CV STRESS DURATION SEC STAGE 2: 0
BH CV STRESS DURATION SEC STAGE 3: 0
BH CV STRESS DURATION SEC STAGE 4: 9
BH CV STRESS GRADE STAGE 1: 10
BH CV STRESS GRADE STAGE 2: 12
BH CV STRESS GRADE STAGE 3: 14
BH CV STRESS GRADE STAGE 4: 16
BH CV STRESS HR STAGE 1: 80
BH CV STRESS HR STAGE 2: 92
BH CV STRESS HR STAGE 3: 112
BH CV STRESS HR STAGE 4: 130
BH CV STRESS METS STAGE 1: 5
BH CV STRESS METS STAGE 2: 7.5
BH CV STRESS METS STAGE 3: 10
BH CV STRESS METS STAGE 4: 13.5
BH CV STRESS PROTOCOL 1: NORMAL
BH CV STRESS RECOVERY BP: NORMAL MMHG
BH CV STRESS RECOVERY HR: 76 BPM
BH CV STRESS SPEED STAGE 1: 1.7
BH CV STRESS SPEED STAGE 2: 2.5
BH CV STRESS SPEED STAGE 3: 3.4
BH CV STRESS SPEED STAGE 4: 4.2
BH CV STRESS STAGE 1: 1
BH CV STRESS STAGE 2: 2
BH CV STRESS STAGE 3: 3
BH CV STRESS STAGE 4: 4
MAXIMAL PREDICTED HEART RATE: 157 BPM
PERCENT MAX PREDICTED HR: 82.8 %
STRESS BASELINE BP: NORMAL MMHG
STRESS BASELINE HR: 60 BPM
STRESS PERCENT HR: 97 %
STRESS POST ESTIMATED WORKLOAD: 13.5 METS
STRESS POST EXERCISE DUR MIN: 10 MIN
STRESS POST EXERCISE DUR SEC: 9 SEC
STRESS POST PEAK BP: NORMAL MMHG
STRESS POST PEAK HR: 130 BPM
STRESS TARGET HR: 133 BPM

## 2024-08-06 ENCOUNTER — TELEPHONE (OUTPATIENT)
Dept: CARDIOLOGY | Facility: CLINIC | Age: 64
End: 2024-08-06

## 2024-08-06 NOTE — TELEPHONE ENCOUNTER
Caller: Claudia Valente    Relationship: Self    Best call back number: [phone]     What is the best time to reach you: ANYTIME    Who are you requesting to speak with (clinical staff, provider,  specific staff member): CLINICAL    Do you know the name of the person who called: CLAUDIA    What was the call regarding: PT WOULD LIKE A CALL FROM DR. NELSON TO DISCUSS HEART CATH PROCEDURE. HE STATED HE ONLY REALLY FELT COMFORTABLE DISCUSSING IT WITH HIM    Is it okay if the provider responds through MyChart: PLEASE CALL

## 2024-08-11 DIAGNOSIS — Z95.5 PRESENCE OF DRUG COATED STENT IN LAD CORONARY ARTERY: ICD-10-CM

## 2024-08-11 DIAGNOSIS — R94.39 ABNORMAL STRESS TEST: ICD-10-CM

## 2024-08-11 DIAGNOSIS — I25.2 HISTORY OF ST ELEVATION MYOCARDIAL INFARCTION (STEMI): ICD-10-CM

## 2024-08-11 DIAGNOSIS — I25.10 CORONARY ARTERY DISEASE INVOLVING NATIVE CORONARY ARTERY OF NATIVE HEART WITHOUT ANGINA PECTORIS: Primary | ICD-10-CM

## 2024-08-13 PROBLEM — R94.39 ABNORMAL STRESS TEST: Status: ACTIVE | Noted: 2024-08-11

## 2024-08-23 ENCOUNTER — HOSPITAL ENCOUNTER (OUTPATIENT)
Facility: HOSPITAL | Age: 64
Setting detail: HOSPITAL OUTPATIENT SURGERY
Discharge: HOME | End: 2024-08-23
Attending: EMERGENCY MEDICINE | Admitting: EMERGENCY MEDICINE
Payer: COMMERCIAL

## 2024-08-23 VITALS
WEIGHT: 207.4 LBS | DIASTOLIC BLOOD PRESSURE: 76 MMHG | SYSTOLIC BLOOD PRESSURE: 138 MMHG | BODY MASS INDEX: 28.09 KG/M2 | HEART RATE: 66 BPM | TEMPERATURE: 98.4 F | HEIGHT: 72 IN | OXYGEN SATURATION: 96 % | RESPIRATION RATE: 18 BRPM

## 2024-08-23 DIAGNOSIS — Z95.5 PRESENCE OF DRUG COATED STENT IN LAD CORONARY ARTERY: ICD-10-CM

## 2024-08-23 DIAGNOSIS — I25.2 HISTORY OF ST ELEVATION MYOCARDIAL INFARCTION (STEMI): ICD-10-CM

## 2024-08-23 DIAGNOSIS — I25.10 CORONARY ARTERY DISEASE INVOLVING NATIVE CORONARY ARTERY OF NATIVE HEART WITHOUT ANGINA PECTORIS: Primary | ICD-10-CM

## 2024-08-23 DIAGNOSIS — R94.39 ABNORMAL STRESS TEST: ICD-10-CM

## 2024-08-23 LAB
ANION GAP SERPL CALCULATED.3IONS-SCNC: 9 MMOL/L (ref 5–15)
BASOPHILS # BLD AUTO: 0.04 10*3/MM3 (ref 0–0.2)
BASOPHILS NFR BLD AUTO: 0.7 % (ref 0–1.5)
BUN SERPL-MCNC: 13 MG/DL (ref 8–23)
BUN/CREAT SERPL: 16 (ref 7–25)
CALCIUM SPEC-SCNC: 9.1 MG/DL (ref 8.6–10.5)
CHLORIDE SERPL-SCNC: 105 MMOL/L (ref 98–107)
CO2 SERPL-SCNC: 26 MMOL/L (ref 22–29)
CREAT SERPL-MCNC: 0.81 MG/DL (ref 0.76–1.27)
DEPRECATED RDW RBC AUTO: 40.9 FL (ref 37–54)
EGFRCR SERPLBLD CKD-EPI 2021: 99.1 ML/MIN/1.73
EOSINOPHIL # BLD AUTO: 0.07 10*3/MM3 (ref 0–0.4)
EOSINOPHIL NFR BLD AUTO: 1.2 % (ref 0.3–6.2)
ERYTHROCYTE [DISTWIDTH] IN BLOOD BY AUTOMATED COUNT: 12.7 % (ref 12.3–15.4)
GLUCOSE SERPL-MCNC: 96 MG/DL (ref 65–99)
HCT VFR BLD AUTO: 48.2 % (ref 37.5–51)
HGB BLD-MCNC: 15.4 G/DL (ref 13–17.7)
IMM GRANULOCYTES # BLD AUTO: 0.02 10*3/MM3 (ref 0–0.05)
IMM GRANULOCYTES NFR BLD AUTO: 0.3 % (ref 0–0.5)
LYMPHOCYTES # BLD AUTO: 2.09 10*3/MM3 (ref 0.7–3.1)
LYMPHOCYTES NFR BLD AUTO: 35.8 % (ref 19.6–45.3)
MCH RBC QN AUTO: 27.8 PG (ref 26.6–33)
MCHC RBC AUTO-ENTMCNC: 32 G/DL (ref 31.5–35.7)
MCV RBC AUTO: 87 FL (ref 79–97)
MONOCYTES # BLD AUTO: 0.54 10*3/MM3 (ref 0.1–0.9)
MONOCYTES NFR BLD AUTO: 9.3 % (ref 5–12)
NEUTROPHILS NFR BLD AUTO: 3.07 10*3/MM3 (ref 1.7–7)
NEUTROPHILS NFR BLD AUTO: 52.7 % (ref 42.7–76)
NRBC BLD AUTO-RTO: 0 /100 WBC (ref 0–0.2)
PLATELET # BLD AUTO: 232 10*3/MM3 (ref 140–450)
PMV BLD AUTO: 10.5 FL (ref 6–12)
POTASSIUM SERPL-SCNC: 4.1 MMOL/L (ref 3.5–5.2)
RBC # BLD AUTO: 5.54 10*6/MM3 (ref 4.14–5.8)
SODIUM SERPL-SCNC: 140 MMOL/L (ref 136–145)
WBC NRBC COR # BLD AUTO: 5.83 10*3/MM3 (ref 3.4–10.8)

## 2024-08-23 PROCEDURE — C1887 CATHETER, GUIDING: HCPCS | Performed by: EMERGENCY MEDICINE

## 2024-08-23 PROCEDURE — S0260 H&P FOR SURGERY: HCPCS | Performed by: EMERGENCY MEDICINE

## 2024-08-23 PROCEDURE — 93571 IV DOP VEL&/PRESS C FLO 1ST: CPT | Performed by: EMERGENCY MEDICINE

## 2024-08-23 PROCEDURE — 93458 L HRT ARTERY/VENTRICLE ANGIO: CPT | Performed by: EMERGENCY MEDICINE

## 2024-08-23 PROCEDURE — 25010000002 MIDAZOLAM HCL (PF) 5 MG/5ML SOLUTION: Performed by: EMERGENCY MEDICINE

## 2024-08-23 PROCEDURE — C1769 GUIDE WIRE: HCPCS | Performed by: EMERGENCY MEDICINE

## 2024-08-23 PROCEDURE — 93799 UNLISTED CV SVC/PROCEDURE: CPT | Performed by: EMERGENCY MEDICINE

## 2024-08-23 PROCEDURE — 25510000001 IOPAMIDOL PER 1 ML: Performed by: EMERGENCY MEDICINE

## 2024-08-23 PROCEDURE — 25010000002 HEPARIN (PORCINE) PER 1000 UNITS: Performed by: EMERGENCY MEDICINE

## 2024-08-23 PROCEDURE — C9600 PERC DRUG-EL COR STENT SING: HCPCS | Performed by: EMERGENCY MEDICINE

## 2024-08-23 PROCEDURE — 25010000002 FENTANYL CITRATE (PF) 50 MCG/ML SOLUTION: Performed by: EMERGENCY MEDICINE

## 2024-08-23 PROCEDURE — 25010000002 BIVALIRUDIN TRIFLUOROACETATE 250 MG RECONSTITUTED SOLUTION 1 EACH VIAL: Performed by: EMERGENCY MEDICINE

## 2024-08-23 PROCEDURE — 99153 MOD SED SAME PHYS/QHP EA: CPT | Performed by: EMERGENCY MEDICINE

## 2024-08-23 PROCEDURE — 80048 BASIC METABOLIC PNL TOTAL CA: CPT | Performed by: EMERGENCY MEDICINE

## 2024-08-23 PROCEDURE — C1894 INTRO/SHEATH, NON-LASER: HCPCS | Performed by: EMERGENCY MEDICINE

## 2024-08-23 PROCEDURE — C1874 STENT, COATED/COV W/DEL SYS: HCPCS | Performed by: EMERGENCY MEDICINE

## 2024-08-23 PROCEDURE — 85025 COMPLETE CBC W/AUTO DIFF WBC: CPT | Performed by: EMERGENCY MEDICINE

## 2024-08-23 PROCEDURE — 99152 MOD SED SAME PHYS/QHP 5/>YRS: CPT | Performed by: EMERGENCY MEDICINE

## 2024-08-23 PROCEDURE — 92928 PRQ TCAT PLMT NTRAC ST 1 LES: CPT | Performed by: EMERGENCY MEDICINE

## 2024-08-23 PROCEDURE — 25010000002 DIPHENHYDRAMINE PER 50 MG: Performed by: EMERGENCY MEDICINE

## 2024-08-23 DEVICE — STNT CORNRY RESOLUTE ONYX RX 2X12MM: Type: IMPLANTABLE DEVICE | Site: CORONARY | Status: FUNCTIONAL

## 2024-08-23 RX ORDER — HEPARIN SODIUM 1000 [USP'U]/ML
INJECTION, SOLUTION INTRAVENOUS; SUBCUTANEOUS
Status: DISCONTINUED | OUTPATIENT
Start: 2024-08-23 | End: 2024-08-23 | Stop reason: HOSPADM

## 2024-08-23 RX ORDER — VERAPAMIL HYDROCHLORIDE 2.5 MG/ML
INJECTION, SOLUTION INTRAVENOUS
Status: DISCONTINUED | OUTPATIENT
Start: 2024-08-23 | End: 2024-08-23 | Stop reason: HOSPADM

## 2024-08-23 RX ORDER — NITROGLYCERIN 0.4 MG/1
0.4 TABLET SUBLINGUAL
Status: DISCONTINUED | OUTPATIENT
Start: 2024-08-23 | End: 2024-08-23 | Stop reason: HOSPADM

## 2024-08-23 RX ORDER — ACETAMINOPHEN 325 MG/1
650 TABLET ORAL EVERY 4 HOURS PRN
Status: DISCONTINUED | OUTPATIENT
Start: 2024-08-23 | End: 2024-08-23 | Stop reason: HOSPADM

## 2024-08-23 RX ORDER — LIDOCAINE HYDROCHLORIDE 20 MG/ML
INJECTION, SOLUTION INFILTRATION; PERINEURAL
Status: DISCONTINUED | OUTPATIENT
Start: 2024-08-23 | End: 2024-08-23 | Stop reason: HOSPADM

## 2024-08-23 RX ORDER — PRASUGREL 10 MG/1
TABLET, FILM COATED ORAL
Status: DISCONTINUED | OUTPATIENT
Start: 2024-08-23 | End: 2024-08-23 | Stop reason: HOSPADM

## 2024-08-23 RX ORDER — SODIUM CHLORIDE 0.9 % (FLUSH) 0.9 %
10 SYRINGE (ML) INJECTION EVERY 12 HOURS SCHEDULED
Status: DISCONTINUED | OUTPATIENT
Start: 2024-08-23 | End: 2024-08-23 | Stop reason: HOSPADM

## 2024-08-23 RX ORDER — SODIUM CHLORIDE 0.9 % (FLUSH) 0.9 %
10 SYRINGE (ML) INJECTION AS NEEDED
Status: DISCONTINUED | OUTPATIENT
Start: 2024-08-23 | End: 2024-08-23 | Stop reason: HOSPADM

## 2024-08-23 RX ORDER — SODIUM CHLORIDE 9 MG/ML
1 INJECTION, SOLUTION INTRAVENOUS CONTINUOUS
Status: DISPENSED | OUTPATIENT
Start: 2024-08-23 | End: 2024-08-23

## 2024-08-23 RX ORDER — MIDAZOLAM HYDROCHLORIDE 5 MG/5ML
INJECTION, SOLUTION INTRAMUSCULAR; INTRAVENOUS
Status: DISCONTINUED | OUTPATIENT
Start: 2024-08-23 | End: 2024-08-23 | Stop reason: HOSPADM

## 2024-08-23 RX ORDER — IOPAMIDOL 755 MG/ML
INJECTION, SOLUTION INTRAVASCULAR
Status: DISCONTINUED | OUTPATIENT
Start: 2024-08-23 | End: 2024-08-23 | Stop reason: HOSPADM

## 2024-08-23 RX ORDER — ASPIRIN 81 MG/1
81 TABLET ORAL DAILY
Qty: 90 TABLET | Refills: 3 | Status: SHIPPED | OUTPATIENT
Start: 2024-08-23

## 2024-08-23 RX ORDER — ASPIRIN 325 MG
TABLET ORAL
Status: DISCONTINUED | OUTPATIENT
Start: 2024-08-23 | End: 2024-08-23 | Stop reason: HOSPADM

## 2024-08-23 RX ORDER — FENTANYL CITRATE 50 UG/ML
INJECTION, SOLUTION INTRAMUSCULAR; INTRAVENOUS
Status: DISCONTINUED | OUTPATIENT
Start: 2024-08-23 | End: 2024-08-23 | Stop reason: HOSPADM

## 2024-08-23 RX ORDER — DIPHENHYDRAMINE HYDROCHLORIDE 50 MG/ML
INJECTION INTRAMUSCULAR; INTRAVENOUS
Status: DISCONTINUED | OUTPATIENT
Start: 2024-08-23 | End: 2024-08-23 | Stop reason: HOSPADM

## 2024-08-23 RX ORDER — SODIUM CHLORIDE 9 MG/ML
100 INJECTION, SOLUTION INTRAVENOUS CONTINUOUS
Status: DISCONTINUED | OUTPATIENT
Start: 2024-08-23 | End: 2024-08-23 | Stop reason: HOSPADM

## 2024-08-23 NOTE — H&P
Patient seen and examined at bedside.  The history and physical have not changed as below.    We will be performing a diagnostic left heart catheterization today with possible intervention based on findings.    Risk, benefits alternatives were explained to the patient he wished to proceed.             Expand All Collapse All         [facility] - CARDIOLOGY  New Patient Initial Outpatient Evaulation     Primary Care Physician: Freddy Germain DO     Subjective           Chief Complaint   Patient presents with    Coronary Artery Disease       6 mo f/u - former Dr. Liz patient         History of Present Illness  History of Present Illness  The patient is a 63-year-old male who presents for evaluation of chest discomfort.     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 This discomfort, described as a dull pressure, is reminiscent of his previous heart attack four years ago. He was transported to [facility] where he was given nitroglycerin and two stents were placed in the main artery. His work is stressful, but he generally feels well over the past four years. He attributes his stress to financial issues. He is unsure if he is on the correct medication. His cholesterol medication was initially prescribed by Dr. Luis, which resulted in a heart attack five months later. He stopped taking his cholesterol medication regularly, but Dr. Liz advised him to resume it. He was taking 40 mg of Lipitor in the morning with his other pills, but he ran out of the 20 mg a month ago and has been taking the remaining 20 mg pills. He is also taking metoprolol and Effient. 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     He occasionally experiences irregular heartbeats, typically at night when he is thinking about things. These irregular heartbeats have occurred approximately six  times in the past year.     Review of Systems   Constitutional: Negative for diaphoresis, fever and malaise/fatigue.   HENT:  Negative for congestion.    Eyes:  Negative for vision loss in left eye and vision loss in right eye.   Cardiovascular:  Positive for chest pain and irregular heartbeat. Negative for claudication, dyspnea on exertion, leg swelling, orthopnea, palpitations and syncope.   Respiratory:  Negative for cough, shortness of breath and wheezing.    Hematologic/Lymphatic: Negative for adenopathy.   Skin:  Negative for rash.   Musculoskeletal:  Negative for joint pain and joint swelling.   Gastrointestinal:  Negative for abdominal pain, diarrhea, nausea and vomiting.   Neurological:  Negative for excessive daytime sleepiness, dizziness, focal weakness, light-headedness, numbness and weakness.   Psychiatric/Behavioral:  Negative for depression. The patient does not have insomnia.          Otherwise complete ROS reviewed and negative except as mentioned in the HPI.        Past Medical History:   Medical History        Past Medical History:   Diagnosis Date    Cancer       prostate cancer    Coronary artery disease      Hyperlipidemia      Myocardial infarction      Primary hypertension 12/30/2022            Past Surgical History:  Surgical History         Past Surgical History:   Procedure Laterality Date    CARDIAC CATHETERIZATION N/A 6/14/2020     Procedure: Left Heart Cath;  Surgeon: Wale Liz MD;  Location:  [facility];  Service: Cardiovascular;  Laterality: N/A;    CORONARY STENT PLACEMENT        PROSTATECTOMY                Family History: family history includes Cancer in his father and mother; No Known Problems in his brother; Prostate cancer in his brother.     Social History:  reports that he has never smoked. He has never used smokeless tobacco. He reports current alcohol use of about 2.0 standard drinks of alcohol per week. He reports that he does not use drugs.    "  Medications:          Prior to Admission medications    Medication Sig Start Date End Date Taking? Authorizing Provider   atorvastatin (LIPITOR) 40 MG tablet Take 1 tablet by mouth every night at bedtime. 7/24/24   Yes Mook Troy DO   metoprolol succinate XL (Toprol XL) 50 MG 24 hr tablet Take 1 tablet by mouth Daily. 7/24/24   Yes Mook Troy DO   prasugrel (EFFIENT) 5 MG tablet Take 1 tablet by mouth Daily. 7/24/24   Yes Mook Troy DO   nitroglycerin (NITROSTAT) 0.4 MG SL tablet PLACE 1 TABLET UNDER TONGUE EVERY 5 MINS, UP TO 3 DOSES AS NEEDED FOR CHEST PAIN 7/25/24     Mook Troy DO      Allergies:  Allergies   No Known Allergies        Objective      Vital Signs: /75   Pulse 79   Ht 182.9 cm (72\")   Wt 93.4 kg (206 lb)   BMI 27.94 kg/m²      Vitals and nursing note reviewed.   Constitutional:       Appearance: Normal and healthy appearance. Well-developed and not in distress.   Eyes:      Extraocular Movements: Extraocular movements intact.      Pupils: Pupils are equal, round, and reactive to light.   HENT:      Head: Normocephalic and atraumatic.    Mouth/Throat:      Pharynx: Oropharynx is clear.   Neck:      Vascular: JVD normal.      Trachea: Trachea normal.   Pulmonary:      Effort: Pulmonary effort is normal.      Breath sounds: Normal breath sounds. No wheezing. No rhonchi. No rales.   Cardiovascular:      PMI at left midclavicular line. Normal rate. Regular rhythm. Normal S1. Normal S2.       Murmurs: There is a grade 2/6 systolic murmur.      No gallop.  No click. No rub.   Pulses:     Dorsalis pedis: 2+ bilaterally.     Posterior tibial: 2+ bilaterally.  Abdominal:      General: Bowel sounds are normal.      Palpations: Abdomen is soft.      Tenderness: There is no abdominal tenderness.   Musculoskeletal: Normal range of motion.      Cervical back: Normal range of motion and neck supple. Skin:     General: Skin is warm and dry. "      Capillary Refill: Capillary refill takes less than 2 seconds.   Feet:      Right foot:      Skin integrity: Skin integrity normal.      Left foot:      Skin integrity: Skin integrity normal.   Neurological:      Mental Status: Alert and oriented to person, place and time.      Sensory: Sensation is intact.      Motor: Motor function is intact.      Coordination: Coordination is intact.   Psychiatric:         Speech: Speech normal.         Behavior: Behavior is cooperative.         Physical Exam        Results Reviewed:     Procedures     Results  Testing  EKG shows STEMI. Stress test from 2.5 years ago showed old heart attack but no new findings.           Lab Results   Component Value Date     CHOL 130 01/05/2022     TRIG 57 02/14/2024     HDL 51 02/14/2024     VLDL 12 02/14/2024     LDLHDL 1.37 01/05/2022            Lab Results   Component Value Date     HGBA1C 5.30 06/15/2020         Assessment / Plan         Problem List Items Addressed This Visit         Coronary artery disease involving native coronary artery of native heart without angina pectoris - Primary     Relevant Medications     prasugrel (EFFIENT) 5 MG tablet     metoprolol succinate XL (Toprol XL) 50 MG 24 hr tablet     Other Relevant Orders     Adult Stress Echo W/ Cont or Stress Agent if Necessary Per Protocol      Other Visit Diagnoses         Stable angina pectoris         Relevant Medications     prasugrel (EFFIENT) 5 MG tablet     metoprolol succinate XL (Toprol XL) 50 MG 24 hr tablet     Other Relevant Orders     Adult Stress Echo W/ Cont or Stress Agent if Necessary Per Protocol     Dyspnea on exertion         Relevant Orders     Adult Transthoracic Echo Complete W/ Cont if Necessary Per Protocol             Assessment & Plan  1. Myocardial infarction.  Given his history of myocardial infarction, it is crucial for him to maintain a high-intensity statin regimen. The 20 mg dosage is insufficient. His last cholesterol check was in 02/2024.  A new prescription for Lipitor 40 mg was provided. He was advised to continue taking Effient and metoprolol, and refills for a year were provided. A dobutamine stress test was ordered. An echocardiogram was also ordered. He will be contacted with the test results. Should another heart catheterization be necessary or medication adjustments are necessary, an earlier appointment will be scheduled.     Follow-up  A follow-up visit is scheduled for 6 months from now.        Transcribed from ambient dictation for Mook Troy DO by Mook Troy DO.  07/28/24   17:21 CDT     Patient or patient representative verbalized consent for the use of Ambient Listening during the visit with  Mook Troy DO for chart documentation. 7/28/2024  17:22 CDT       Contains text generated by [employer]

## 2024-08-23 NOTE — OP NOTE
"  [facility]  Date of procedure: 8/23/2024     Procedures performed:     1.  Access to the right radial artery via modified Seldinger technique  2.  Left heart catheterization with retrograde crossed aortic valve to the left ventricle pressures were recorded  3.  LV ventriculography  4.  Selective bilateral coronary angiography  5.  Conscious sedation with continuous hemodynamic monitoring for 35 minutes  6.  Patent hemostasis achieved in the right radial artery access site using a TR band  7.  Successful direct PCI to the distal LAD with a resolute [place] 2 x 12 mm drug-eluting stent  8.  RFR analysis of the proximal/mid LAD with a ratio recorded at greater than 0.90 indicating the lesion was not hemodynamically significant  9.  RFR analysis of the distal LAD with a ratio recorded at 0.87 indicating the lesion was hemodynamically significant      Risk, Benefits, and Alternatives discussed with the patient and/or family.  Plan is for moderate sedation, and the patient agrees to proceed with the procedure.  An immediate assessment was done prior to the administration of moderate sedation        Indication: Abnormal stress test concerning for apical ischemia, history of coronary artery disease status post 2 stents placed to proximal/mid LAD from STEMI in remote past  Premedication: Versed, Fentanyl  Contrast: Isovue 243 cc   radiation: Flouro time= 7 minutes. Air Kerma= 851 mGy  Catheters: 5F TIG, pigtail  Guiding catheter: XB 3.5  PCI Hardware: .014\" pressure wire, resolute Jame stent      Procedural details:    The patient was brought to the cath lab and prepped and draped in the usual fashion.  Access was obtained in the right radial artery via modified Seldinger technique.  A 6 [place] sheath was placed into the artery and flushed.  Next, a TIG catheter was inserted and used to engage both the left and right coronary arteries and selective bilateral coronary angiography performed in multiple " views.  We then inserted a pigtail catheter which was used to cross aortic valve to the left ventricle pressure recorded LV ventriculography was performed.  This catheter was then pulled back across aortic valve and again pressures were recorded.  Next, an XB 3.5 catheter was inserted and used to engage the left main.  A pressure wire was inserted after nitroglycerin was administered intracoronary.  Pressures were equalized in the left circumflex.  The wire was retracted back and advanced into the distal LAD.  RFR analysis was performed with a ratio recorded 0.87 indicating the lesion was hemodynamically significant to the distal vessel.  The wire was then retracted back into the mid vessel just distal to the old stents and RFR analysis was reperformed with a ratio recorded at greater than 0.90 indicating no hemodynamically significant lesion proximal or within the old stents.  The wire was then advanced back into the distal vessel.  A resolute [place] 2 x 12 mm balloon was inserted across the lesion in the distal vessel where was deployed at 12 jacinto for 40 seconds.  Postintervention angiography was performed in multiple views.  Everything was then withdrawn through the sheath and the sheath was flushed.  Patent hemostasis was achieved in the right radial artery access site using a TR band.  Patient tolerated procedure well without any complications.  He left the Cath Lab in stable condition.    I supervised the administration of conscious sedation by nursing staff throughout the case.  First dose was given at 1024 and the end of my face-to-face encounter was at 1100, accounting for a total of 35 minutes of supervision.  During the case, continuous pulse oximetry, heart rate, blood pressure, and patient status were monitored.     Findings:    Hemodynamics:    Aortic: 113/71 mmHg LV: 138/1 mmHg  LVEDP: 25 mmHg      Left ventriculography:  1. The contractility of the left ventricle is normal.  Estimated ejection fraction  55%.  2.  No significant gradient across the aortic valve on pullback    Selective coronary angiography:     Left Main: Large-caliber vessel that bifurcates into the LAD and left circumflex coronary arteries, no angiographic evidence of stenosis, ARTEMIO-3 flow    LAD: Large-caliber vessel with approximately 40 to 50% stenosis in the proximal vessel prior to old stent material, there is minor in-stent stenosis, there is approximately 20 to 30% stenosis distal to the old stent, the mid vessel has mild disease only, there is 80 to 90% stenosis in the distal vessel prior to wrapping around the apex, ARTEMIO-3 flow.  Status post successful direct PCI to the distal vessel we had continuation ARTEMIO-3 flow and no residual stenosis remaining in the distal LAD.    Diagonals: First diagonal is small caliber, second diagonal is moderate caliber with 60 to 70% stenosis at the ostium and has multiple branches with mild disease, remaining diagonals are small caliber vessels    Left circumflex: Large-caliber, dominant vessel with minor disease noted in the midsegment with mild calcification, ARTEMIO-3 flow    Obtuse marginals: First obtuse marginal small caliber, second obtuse marginal small caliber, third obtuse marginal is large caliber with no significant disease    RCA: Moderate caliber vessel with no angiographic evidence of stenosis, ARTEMIO-3 flow    PDA/LANCE: PDA is small caliber, LANCE is small to moderate caliber with no significant disease    Estimated Blood Loss: 20 cc    Specimens: None    Complications: None     Impression:  1.  Coronary artery disease as described above including a hemodynamically significant lesion in the distal LAD by RFR analysis and consistent with ischemia on stress test.  This is status post successful direct PCI with continuation ARTEMIO-3 flow and 0 residual percent stenosis remaining       Plan:   1.  TR band off in 2 hours, discharge home later today  2.  Dual antiplatelet therapy with aspirin and Effient  for a minimum of 6 months, 1 year ideally  3.  Continue on Lipitor 40 and Toprol XL 50  4.  Referral for cardiac rehab  5.  Close follow-up with [facility] as an outpatient      Mook Troy DO  Interventional cardiology  [facility]  August 23, 2024

## 2024-08-23 NOTE — Clinical Note
Hemostasis started on the right radial artery. R-Band was used in achieving hemostasis. Radial compression device applied to vessel. Hemostasis achieved successfully. Closure device additional comment: 13 ml in TR band

## 2024-08-26 ENCOUNTER — CALL CENTER PROGRAMS (OUTPATIENT)
Dept: CALL CENTER | Facility: HOSPITAL | Age: 64
End: 2024-08-26
Payer: COMMERCIAL

## 2024-08-26 NOTE — OUTREACH NOTE
PCI/Device Survey      Flowsheet Row Responses   Facility patient discharged from? [facility]   Procedure date 08/23/24   Procedure (if device, specify in description) PCI   PCI site Right, Arm   Performing MD Other (annotate)  [Dr. Mook Troy]   Attempt successful? Yes   Call start time 0845   Call end time 0849   Has the patient had any of the following symptoms since discharge? --  [no symptoms noted]   Nursing interventions Patient education provided   Is the patient taking prescribed medications: ASA   Nursing intervention Reminded to continue to take prescribed medications   Does the patient have any of the following symptoms related to the cath/surgical site? --  [no symptoms noted]   Nursing intervention Patient education provided   Does the patient have an appointment scheduled with the cardiologist? Yes   Appointment comments Follow up with cardiologist Dr. Troy on 1/22/25   If the patient is a current smoker, are they able to teach back resources for cessation? Not a smoker   Did the patient feel prepared to go home on the same day as the procedure? Yes   Is the patient satisfied with the same day discharge process? Yes   PCI/Device call completed Yes   Wrap up additional comments Doing well, all questions addressed.            Kim TURCIOS - Registered Nurse

## 2024-09-19 ENCOUNTER — OFFICE VISIT (OUTPATIENT)
Dept: FAMILY MEDICINE CLINIC | Age: 64
End: 2024-09-19
Payer: COMMERCIAL

## 2024-09-19 VITALS
SYSTOLIC BLOOD PRESSURE: 128 MMHG | WEIGHT: 208 LBS | HEART RATE: 70 BPM | BODY MASS INDEX: 28.17 KG/M2 | DIASTOLIC BLOOD PRESSURE: 82 MMHG | TEMPERATURE: 98 F | HEIGHT: 72 IN | OXYGEN SATURATION: 97 %

## 2024-09-19 DIAGNOSIS — I10 PRIMARY HYPERTENSION: Primary | ICD-10-CM

## 2024-09-19 DIAGNOSIS — Z95.5 STATUS POST CORONARY ARTERY STENT PLACEMENT: ICD-10-CM

## 2024-09-19 DIAGNOSIS — C61 PROSTATE CANCER (HCC): ICD-10-CM

## 2024-09-19 DIAGNOSIS — E78.2 MIXED HYPERLIPIDEMIA: ICD-10-CM

## 2024-09-19 DIAGNOSIS — F41.9 ANXIETY: ICD-10-CM

## 2024-09-19 DIAGNOSIS — R53.83 FATIGUE, UNSPECIFIED TYPE: ICD-10-CM

## 2024-09-19 DIAGNOSIS — I25.118 CORONARY ARTERY DISEASE OF NATIVE ARTERY OF NATIVE HEART WITH STABLE ANGINA PECTORIS (HCC): ICD-10-CM

## 2024-09-19 PROCEDURE — 3074F SYST BP LT 130 MM HG: CPT | Performed by: PEDIATRICS

## 2024-09-19 PROCEDURE — 99214 OFFICE O/P EST MOD 30 MIN: CPT | Performed by: PEDIATRICS

## 2024-09-19 PROCEDURE — 3079F DIAST BP 80-89 MM HG: CPT | Performed by: PEDIATRICS

## 2024-09-19 RX ORDER — SALICYLIC ACID 40 %
81 ADHESIVE PATCH, MEDICATED TOPICAL DAILY
COMMUNITY
Start: 2024-08-23

## 2024-09-19 RX ORDER — NITROGLYCERIN 0.4 MG/1
0.4 TABLET SUBLINGUAL EVERY 5 MIN PRN
COMMUNITY
Start: 2024-07-25

## 2024-09-19 RX ORDER — ESCITALOPRAM OXALATE 10 MG/1
10 TABLET ORAL DAILY
Qty: 30 TABLET | Refills: 5 | Status: SHIPPED | OUTPATIENT
Start: 2024-09-19

## 2024-09-19 SDOH — ECONOMIC STABILITY: FOOD INSECURITY: WITHIN THE PAST 12 MONTHS, YOU WORRIED THAT YOUR FOOD WOULD RUN OUT BEFORE YOU GOT MONEY TO BUY MORE.: NEVER TRUE

## 2024-09-19 SDOH — ECONOMIC STABILITY: INCOME INSECURITY: HOW HARD IS IT FOR YOU TO PAY FOR THE VERY BASICS LIKE FOOD, HOUSING, MEDICAL CARE, AND HEATING?: NOT VERY HARD

## 2024-09-19 SDOH — ECONOMIC STABILITY: FOOD INSECURITY: WITHIN THE PAST 12 MONTHS, THE FOOD YOU BOUGHT JUST DIDN'T LAST AND YOU DIDN'T HAVE MONEY TO GET MORE.: NEVER TRUE

## 2024-09-19 ASSESSMENT — ENCOUNTER SYMPTOMS
NAUSEA: 0
ABDOMINAL PAIN: 0
SORE THROAT: 0
DIARRHEA: 0
SINUS PRESSURE: 0
ALLERGIC/IMMUNOLOGIC NEGATIVE: 1
WHEEZING: 0
TROUBLE SWALLOWING: 0
CONSTIPATION: 0
ROS SKIN COMMENTS: NO NEW OR SUSPICIOUS SKIN LESIONS
SHORTNESS OF BREATH: 0
COUGH: 0
BACK PAIN: 0
RHINORRHEA: 0
VOMITING: 0
CHEST TIGHTNESS: 0

## 2024-09-19 ASSESSMENT — PATIENT HEALTH QUESTIONNAIRE - PHQ9
SUM OF ALL RESPONSES TO PHQ QUESTIONS 1-9: 0
SUM OF ALL RESPONSES TO PHQ9 QUESTIONS 1 & 2: 0
2. FEELING DOWN, DEPRESSED OR HOPELESS: NOT AT ALL
1. LITTLE INTEREST OR PLEASURE IN DOING THINGS: NOT AT ALL
SUM OF ALL RESPONSES TO PHQ QUESTIONS 1-9: 0

## 2024-10-15 DIAGNOSIS — F41.9 ANXIETY: ICD-10-CM

## 2024-10-15 RX ORDER — ESCITALOPRAM OXALATE 10 MG/1
10 TABLET ORAL DAILY
Qty: 90 TABLET | Refills: 3 | Status: SHIPPED | OUTPATIENT
Start: 2024-10-15

## 2024-10-15 NOTE — TELEPHONE ENCOUNTER
Received fax from pharmacy requesting refill on pts medication(s). Pharmacy is requesting 90 day supply. Pt was last seen in office on 9/19/2024  and has a follow up scheduled for Visit date not found. Will send request to  Dr. Valenzuela  for authorization.     Requested Prescriptions     Pending Prescriptions Disp Refills    escitalopram (LEXAPRO) 10 MG tablet [Pharmacy Med Name: ESCITALOPRAM 10 MG TABLET] 90 tablet 3     Sig: TAKE 1 TABLET BY MOUTH EVERY DAY

## 2024-11-22 ENCOUNTER — OFFICE VISIT (OUTPATIENT)
Dept: CARDIOLOGY | Facility: CLINIC | Age: 64
End: 2024-11-22
Payer: COMMERCIAL

## 2024-11-22 VITALS
WEIGHT: 215 LBS | SYSTOLIC BLOOD PRESSURE: 138 MMHG | OXYGEN SATURATION: 99 % | HEIGHT: 72 IN | HEART RATE: 60 BPM | BODY MASS INDEX: 29.12 KG/M2 | DIASTOLIC BLOOD PRESSURE: 82 MMHG

## 2024-11-22 DIAGNOSIS — E78.5 HYPERLIPIDEMIA LDL GOAL <70: ICD-10-CM

## 2024-11-22 DIAGNOSIS — Z95.5 PRESENCE OF DRUG COATED STENT IN LAD CORONARY ARTERY: ICD-10-CM

## 2024-11-22 DIAGNOSIS — I25.10 CORONARY ARTERY DISEASE INVOLVING NATIVE CORONARY ARTERY OF NATIVE HEART WITHOUT ANGINA PECTORIS: Primary | ICD-10-CM

## 2024-11-22 DIAGNOSIS — I10 PRIMARY HYPERTENSION: ICD-10-CM

## 2024-11-22 NOTE — PROGRESS NOTES
Reason For Visit:  Coronary Artery Disease (3 MON FU S/P CATH )     Subjective        Wale Valente is a 64 y.o. male with the below pertinent PMH who presents for follow-up of CAD.    Patient underwent PCI on 8/23/2024 by Dr. Troy due to angina.  Hemodynamically significant lesion in the LAD.  PCI x 1 to that area.    Since his PCI has been doing reasonably well.  He tells me that his episodes of shortness of breath and chest pain have improved.  He is tolerating his regimen well with no significant side effects.  He did see his PCP and was prescribed medication for anxiety, however has not started it.  He believes that a lot of his ongoing symptoms may be related to anxiety.      ROS: Pertinent findings as noted above    Pertinent PMH  - History of coronary artery disease with STEMI in 2020 requiring 2 stents to the LAD with recent stent to the LAD in August 2024  - Hypertension  - Hyperlipidemia    Pertinent past medical, surgical, family, and social history were reviewed.      Current Outpatient Medications:     aspirin 81 MG EC tablet, Take 1 tablet by mouth Daily., Disp: 90 tablet, Rfl: 3    atorvastatin (LIPITOR) 40 MG tablet, Take 1 tablet by mouth every night at bedtime., Disp: 90 tablet, Rfl: 3    metoprolol succinate XL (Toprol XL) 50 MG 24 hr tablet, Take 1 tablet by mouth Daily., Disp: 90 tablet, Rfl: 3    nitroglycerin (NITROSTAT) 0.4 MG SL tablet, PLACE 1 TABLET UNDER TONGUE EVERY 5 MINS, UP TO 3 DOSES AS NEEDED FOR CHEST PAIN, Disp: 20 tablet, Rfl: 1    prasugrel (EFFIENT) 5 MG tablet, Take 1 tablet by mouth Daily., Disp: 90 tablet, Rfl: 3  No current facility-administered medications for this visit.    Facility-Administered Medications Ordered in Other Visits:     heparin infusion 2 units/mL in 0.9% NaCl, , , PRN, Wale Liz MD, 1,000 mL at 06/14/20 1342    iopamidol (ISOVUE-370) 76 % injection, , , PRN, Wale Liz MD, 162 mL at 06/14/20 1345    lidocaine (cardiac) (XYLOCAINE)  "injection, , , PRN, Liz, Wlae SAVAGE MD, 10 mL at 06/14/20 1323     Objective   Vital Signs:  /82   Pulse 60   Ht 182.9 cm (72.01\")   Wt 97.5 kg (215 lb)   SpO2 99%   BMI 29.15 kg/m²   Estimated body mass index is 29.15 kg/m² as calculated from the following:    Height as of this encounter: 182.9 cm (72.01\").    Weight as of this encounter: 97.5 kg (215 lb).      Constitutional:       Appearance: Healthy appearance. Not in distress.   Neck:      Vascular: JVD normal.   Pulmonary:      Effort: Pulmonary effort is normal.      Breath sounds: Normal breath sounds. No wheezing. No rhonchi. No rales.   Cardiovascular:      PMI at left midclavicular line. Normal rate. Regular rhythm. Normal S1. Normal S2.       Murmurs: There is no murmur.      No gallop.  No click. No rub.   Edema:     Peripheral edema absent.   Musculoskeletal: Normal range of motion.      Cervical back: Normal range of motion. Skin:     General: Skin is warm and dry.   Neurological:      Mental Status: Alert and oriented to person, place and time.        Result Review :  The following data was reviewed by: SENG Lew on 11/22/2024:  CMP   CMP          2/14/2024    09:27 8/23/2024    09:05   CMP   Glucose 87  96    BUN 15  13    Creatinine 1.00  0.81    EGFR  99.1    Sodium 141  140    Potassium 4.6  4.1    Chloride 103  105    Calcium 9.7  9.1    Total Protein 6.9     Albumin 4.7     Globulin 2.2     Total Bilirubin 0.9     Alkaline Phosphatase 72     AST (SGOT) 22     ALT (SGPT) 25     BUN/Creatinine Ratio 15  16.0    Anion Gap  9.0      CBC   CBC          8/23/2024    09:05   CBC   WBC 5.83    RBC 5.54    Hemoglobin 15.4    Hematocrit 48.2    MCV 87.0    MCH 27.8    MCHC 32.0    RDW 12.7    Platelets 232      Lipid Panel   Lipid Panel          2/14/2024    09:27   Lipid Panel   Total Cholesterol 135    Triglycerides 57    HDL Cholesterol 51    VLDL Cholesterol 12    LDL Cholesterol  72      BMP   BMP          2/14/2024    09:27 " 8/23/2024    09:05   BMP   BUN 15  13    Creatinine 1.00  0.81    Sodium 141  140    Potassium 4.6  4.1    Chloride 103  105    CO2 23  26.0    Calcium 9.7  9.1      Data reviewed : Cardiology studies echo      Results for orders placed during the hospital encounter of 07/30/24    Adult Stress Echo W/ Cont or Stress Agent if Necessary Per Protocol    Interpretation Summary    Overall, this is an intermediate risk test for ischemia.  Would consider further cardiology workup if there is a high index of clinical suspicion for progression of coronary artery disease in this patient.    No treadmill score of 10    No ischemic changes on EKG during stress    No clinical symptoms of ischemia during stress    Excellent function capacity at 13.5 metabolic equivalents    Appropriate heart rate and blood pressure response to stress    Abnormal stress echo consistent with a high risk study for myocardial ischemia.    Left ventricular systolic function is normal. Left ventricular ejection fraction appears to be 56 - 60%.    The following left ventricular wall segments are hypokinetic: apical lateral, apical inferior, apical septal and apex hypokinetic.         Assessment and Plan   Diagnoses and all orders for this visit:    1. Coronary artery disease involving native coronary artery of native heart without angina pectoris (Primary)  2. Presence of drug coated stent in LAD coronary artery  3. Hyperlipidemia LDL goal <70  4. Primary hypertension  -At this time doing well with no clear signs of angina  - Continue aspirin 81 mg daily as well as Effient 5 mg daily (dual antiplatelet therapy for at least 1 year)  - Continue Toprol-XL 50 mg daily  - Continue Lipitor 40 mg nightly  - As needed nitroglycerin  - Will reorder echocardiogram the patient did not have completed earlier this year (patient was unaware that it was scheduled)             Follow Up   Return in about 6 months (around 5/22/2025).  Patient was given instructions  and counseling regarding his condition or for health maintenance advice. Please see specific information pulled into the AVS if appropriate.       Part of this note may be an electronic transcription/translation of spoken language to printed text using the Dragon Dictation System.

## 2025-05-22 ENCOUNTER — OFFICE VISIT (OUTPATIENT)
Dept: CARDIOLOGY | Facility: CLINIC | Age: 65
End: 2025-05-22
Payer: COMMERCIAL

## 2025-05-22 VITALS
SYSTOLIC BLOOD PRESSURE: 142 MMHG | OXYGEN SATURATION: 97 % | WEIGHT: 203 LBS | HEART RATE: 58 BPM | HEIGHT: 72 IN | DIASTOLIC BLOOD PRESSURE: 60 MMHG | BODY MASS INDEX: 27.5 KG/M2

## 2025-05-22 DIAGNOSIS — Z95.5 PRESENCE OF DRUG COATED STENT IN LAD CORONARY ARTERY: Primary | ICD-10-CM

## 2025-05-22 DIAGNOSIS — I10 PRIMARY HYPERTENSION: ICD-10-CM

## 2025-05-22 DIAGNOSIS — I20.89 STABLE ANGINA PECTORIS: ICD-10-CM

## 2025-05-22 DIAGNOSIS — E78.5 HYPERLIPIDEMIA LDL GOAL <70: ICD-10-CM

## 2025-05-22 DIAGNOSIS — I25.10 CORONARY ARTERY DISEASE INVOLVING NATIVE CORONARY ARTERY OF NATIVE HEART WITHOUT ANGINA PECTORIS: ICD-10-CM

## 2025-05-22 DIAGNOSIS — I25.2 HISTORY OF ST ELEVATION MYOCARDIAL INFARCTION (STEMI): ICD-10-CM

## 2025-05-22 RX ORDER — METOPROLOL SUCCINATE 50 MG/1
50 TABLET, EXTENDED RELEASE ORAL DAILY
Qty: 90 TABLET | Refills: 3 | Status: SHIPPED | OUTPATIENT
Start: 2025-05-22

## 2025-05-22 NOTE — PROGRESS NOTES
Subjective:     Encounter Date:05/22/2025      Patient ID: Wale Valente is a 64 y.o. male.    Chief Complaint:  History of Present Illness  History of Present Illness  The patient presents for evaluation of coronary artery disease.    He reports no new symptoms or complications over the past few months. He has not experienced any episodes of atrial fibrillation, chest pain, pressure, or tightness. He recalls experiencing severe pressure during his heart attack 5 years ago but has not had similar symptoms in the past year. He admits to occasional confusion regarding his medication regimen and does not take them daily. He is currently on aspirin, Effient, and Lipitor.     The patient underwent a stent placement in the distal LAD in 08/2024. His medical history includes a myocardial infarction 5 years ago, managed with the placement of 2 stents by Dr. Liz.    He acknowledges a poor diet and occasional cigar smoking, with the most recent instance being the previous night. He also admits to occasional marijuana use.    SOCIAL HISTORY  Occupations:  for restaurants, bars, and [employer]  Exercise: Significant physical labor  Diet: Eating terribly  Tobacco: Smokes cigars occasionally  Recreational Drugs: Uses marijuana every once in a while    The following portions of the patient's history were reviewed and updated as appropriate: allergies, current medications, past family history, past medical history, past social history, past surgical history, and problem list.    Review of Systems   Constitutional: Negative for diaphoresis, fever and malaise/fatigue.   HENT:  Negative for congestion.    Eyes:  Negative for vision loss in left eye and vision loss in right eye.   Cardiovascular:  Negative for chest pain, claudication, dyspnea on exertion, irregular heartbeat, leg swelling, orthopnea, palpitations and syncope.   Respiratory:  Negative for cough, shortness of breath and wheezing.     Hematologic/Lymphatic: Negative for adenopathy.   Skin:  Negative for rash.   Musculoskeletal:  Negative for joint pain and joint swelling.   Gastrointestinal:  Negative for abdominal pain, diarrhea, nausea and vomiting.   Neurological:  Negative for excessive daytime sleepiness, dizziness, focal weakness, light-headedness, numbness and weakness.   Psychiatric/Behavioral:  Negative for depression. The patient does not have insomnia.            Current Outpatient Medications:     aspirin 81 MG EC tablet, Take 1 tablet by mouth Daily., Disp: 90 tablet, Rfl: 3    atorvastatin (LIPITOR) 40 MG tablet, Take 1 tablet by mouth every night at bedtime., Disp: 90 tablet, Rfl: 3    metoprolol succinate XL (Toprol XL) 50 MG 24 hr tablet, Take 1 tablet by mouth Daily., Disp: 90 tablet, Rfl: 3    nitroglycerin (NITROSTAT) 0.4 MG SL tablet, PLACE 1 TABLET UNDER TONGUE EVERY 5 MINS, UP TO 3 DOSES AS NEEDED FOR CHEST PAIN, Disp: 20 tablet, Rfl: 1    prasugrel (EFFIENT) 5 MG tablet, Take 1 tablet by mouth Daily., Disp: 90 tablet, Rfl: 3  No current facility-administered medications for this visit.    Facility-Administered Medications Ordered in Other Visits:     heparin infusion 2 units/mL in 0.9% NaCl, , , PRN, Wale Liz MD, 1,000 mL at 06/14/20 1342    iopamidol (ISOVUE-370) 76 % injection, , , PRN, Wale Liz MD, 162 mL at 06/14/20 1345    lidocaine (cardiac) (XYLOCAINE) injection, , , PRN, Wale Liz MD, 10 mL at 06/14/20 1323       Objective:      Vitals:    05/22/25 0902   BP: 142/60   Pulse: 58   SpO2: 97%     Vitals and nursing note reviewed.   Constitutional:       Appearance: Normal and healthy appearance. Well-developed and not in distress.   Eyes:      Extraocular Movements: Extraocular movements intact.      Pupils: Pupils are equal, round, and reactive to light.   HENT:      Head: Normocephalic and atraumatic.    Mouth/Throat:      Pharynx: Oropharynx is clear.   Neck:      Vascular: JVD normal.       Trachea: Trachea normal.   Pulmonary:      Effort: Pulmonary effort is normal.      Breath sounds: Normal breath sounds. No wheezing. No rhonchi. No rales.   Cardiovascular:      PMI at left midclavicular line. Normal rate. Regular rhythm. Normal S1. Normal S2.       Murmurs: There is a grade 2/6 systolic murmur.      No gallop.  No click. No rub.   Pulses:     Dorsalis pedis: 2+ bilaterally.     Posterior tibial: 2+ bilaterally.  Abdominal:      General: Bowel sounds are normal.      Palpations: Abdomen is soft.      Tenderness: There is no abdominal tenderness.   Musculoskeletal: Normal range of motion.      Cervical back: Normal range of motion and neck supple. Skin:     General: Skin is warm and dry.      Capillary Refill: Capillary refill takes less than 2 seconds.   Feet:      Right foot:      Skin integrity: Skin integrity normal.      Left foot:      Skin integrity: Skin integrity normal.   Neurological:      Mental Status: Alert and oriented to person, place and time.      Sensory: Sensation is intact.      Motor: Motor function is intact.      Coordination: Coordination is intact.   Psychiatric:         Speech: Speech normal.         Behavior: Behavior is cooperative.       Physical Exam      Lab Review:       Procedures  Results  Imaging   - Stent placement in the distal LAD: 08/2024, New blockage observed before the stent. Mild in-stent stenosis noted. Diagonal branch shows some disease.    Assessment/Plan:     Problem List Items Addressed This Visit       Coronary artery disease involving native coronary artery of native heart without angina pectoris    Relevant Medications    metoprolol succinate XL (Toprol XL) 50 MG 24 hr tablet    Presence of drug coated stent in LAD coronary artery - Primary    Overview   3.0 x 18 mm Xience drug-eluting stent and 3.0 X 8 mm Xience drug-eluting stent to the mid LAD 6/14/20 for STEMI         Relevant Orders    Stress Test With Myocardial Perfusion (1 Day)     Hyperlipidemia LDL goal <70    History of ST elevation myocardial infarction (STEMI)    Primary hypertension    Relevant Medications    metoprolol succinate XL (Toprol XL) 50 MG 24 hr tablet    Stable angina pectoris    Relevant Medications    metoprolol succinate XL (Toprol XL) 50 MG 24 hr tablet    Other Relevant Orders    Stress Test With Myocardial Perfusion (1 Day)     Assessment & Plan  1. Coronary Artery Disease:  - Condition stable with no significant symptoms such as chest pain, pressure, or tightness.  - Concern about the appearance of the stent placed 5 years ago, showing signs of collapsing and new blockage formation.  - Advised to quit smoking cigars as it can constrict blood vessels and stress the stents.  - Stress test scheduled for 10/2025 to ensure no ischemia in the vessel.  - Continue current medication regimen: aspirin, Effient, and Lipitor, crucial for maintaining stent patency and preventing further plaque accumulation.  - Prescription refill for metoprolol provided and sent to pharmacy.  - Contact office immediately if experiencing symptoms such as pressure, tightness, or shortness of breath.    Follow-up: Stress test scheduled for 10/2025.      Recommendations/plans:    Transcribed from ambient dictation for Mook Troy DO by Mook Troy DO.  05/22/25   11:24 CDT    Patient or patient representative verbalized consent for the use of Ambient Listening during the visit with  Mook Troy DO for chart documentation. 5/22/2025  11:24 CDT

## (undated) DEVICE — CANNULA NSL AD L7FT DIV O2 CO2 W/ M LUERLOCK TRMPT CONN

## (undated) DEVICE — PK CATH CARD 30 CA/4

## (undated) DEVICE — 6F .070 XB LAD 3.5 100CM: Brand: VISTA BRITE TIP

## (undated) DEVICE — SOLIDIFIER LIQ LIQUILOC/PLUS W/TREAT 2000CC

## (undated) DEVICE — BRUSH ENDOSCP 2 END CHN HEDGEHOG

## (undated) DEVICE — COPILOT BLEEDBACK CONTROL VALVE: Brand: COPILOT

## (undated) DEVICE — SOL IRR NACL 0.9PCT BT 1000ML

## (undated) DEVICE — SKIN PREP TRAY W/CHG: Brand: MEDLINE INDUSTRIES, INC.

## (undated) DEVICE — RADIFOCUS OPTITORQUE ANGIOGRAPHIC CATHETER: Brand: OPTITORQUE

## (undated) DEVICE — PERCLOSE PROGLIDE™ SUTURE-MEDIATED CLOSURE SYSTEM: Brand: PERCLOSE PROGLIDE™

## (undated) DEVICE — GW GRAPHX .014 182CM

## (undated) DEVICE — TOOL INSRT GW MTL OR PLSTC

## (undated) DEVICE — SNARE ENDOSCP W10MMXL240CM SHTH DIA24MM RND INSUL STIFF

## (undated) DEVICE — INFLATION DEVICE: Brand: ENCORE™ 26

## (undated) DEVICE — SINGLE PORT MANIFOLD: Brand: NEPTUNE 2

## (undated) DEVICE — MODEL BT2000 P/N 700287-012KIT CONTENTS: MANIFOLD WITH SALINE AND CONTRAST PORTS, SALINE TUBING WITH SPIKE AND HAND SYRINGE, TRANSDUCER: Brand: BT2000 AUTOMATED MANIFOLD KIT

## (undated) DEVICE — GLIDESHEATH SLENDER STAINLESS STEEL KIT: Brand: GLIDESHEATH SLENDER

## (undated) DEVICE — SOL NS 500ML

## (undated) DEVICE — TR BAND RADIAL ARTERY COMPRESSION DEVICE: Brand: TR BAND

## (undated) DEVICE — ADAPTER CLEANING PORPOISE CLEANING

## (undated) DEVICE — PINNACLE INTRODUCER SHEATH: Brand: PINNACLE

## (undated) DEVICE — CANN CO2/O2 NASL A/

## (undated) DEVICE — KT NDL GUIDE STRL 18GA

## (undated) DEVICE — 6F .070 XB 3.5 100CM: Brand: VISTA BRITE TIP

## (undated) DEVICE — SOL NACL INJ 0.9PCT 50ML

## (undated) DEVICE — FORCEPS BX 240CM 2.4MM L NDL RAD JAW 4 M00513334

## (undated) DEVICE — PAD, DEFIB, ADULT, RADIOTRANS, PHYSIO: Brand: MEDLINE

## (undated) DEVICE — ST PRIM PUMP 20DRP 3VLV 127IN

## (undated) DEVICE — TRAP,MUCUS SPECIMEN,40CC: Brand: MEDLINE

## (undated) DEVICE — DRAPE,ANGIO,BRACH,STERILE,38X44: Brand: MEDLINE

## (undated) DEVICE — ELECTRD PAD DEFIB A/

## (undated) DEVICE — VLV HEMO GUARDIAN INSRT/TOOL W TORQ DEV

## (undated) DEVICE — GW STARTER FXD CORE J .035 3X150CM 3MM

## (undated) DEVICE — DRSNG SURESITE WNDW 4X4.5

## (undated) DEVICE — PTCA DILATATION CATHETER: Brand: EMERGE™

## (undated) DEVICE — CANN NASL ETCO2 LO/FLO A/

## (undated) DEVICE — MODEL AT P65, P/N 701554-001KIT CONTENTS: HAND CONTROLLER, 3-WAY HIGH-PRESSURE STOPCOCK WITH ROTATING END AND PREMIUM HIGH-PRESSURE TUBING: Brand: ANGIOTOUCH® KIT

## (undated) DEVICE — GW STARTER FXD CORE J .035 3X260CM 3MM

## (undated) DEVICE — SUP ARMBRD ART/LINE BLU

## (undated) DEVICE — SOLIDIFIER LIQUI LOC PLUS 2000CC

## (undated) DEVICE — COLON KIT WITH 1.1 OZ ORCA HYDRA SEAL 2 GOWN

## (undated) DEVICE — GW PRESSUREWIRE X WIRELESS FFR 175CM

## (undated) DEVICE — SPONGE ENDOSCP CLN BS INF PREVENTION KOALA

## (undated) DEVICE — CATH DIAG IMPULSE M/ PK 145 5FR

## (undated) DEVICE — CATH DIAG IMPULSE PIG145 5F 110CM

## (undated) DEVICE — DEV TORQ GW HOT/PINK